# Patient Record
Sex: FEMALE | Race: WHITE | Employment: FULL TIME | URBAN - METROPOLITAN AREA
[De-identification: names, ages, dates, MRNs, and addresses within clinical notes are randomized per-mention and may not be internally consistent; named-entity substitution may affect disease eponyms.]

---

## 2021-01-27 ENCOUNTER — TRANSCRIBE ORDER (OUTPATIENT)
Dept: SCHEDULING | Age: 51
End: 2021-01-27

## 2021-01-27 DIAGNOSIS — H93.A3 PULSATILE TINNITUS, BILATERAL: Primary | ICD-10-CM

## 2021-02-12 ENCOUNTER — APPOINTMENT (OUTPATIENT)
Dept: GENERAL RADIOLOGY | Age: 51
End: 2021-02-12
Attending: EMERGENCY MEDICINE
Payer: COMMERCIAL

## 2021-02-12 ENCOUNTER — HOSPITAL ENCOUNTER (EMERGENCY)
Age: 51
Discharge: HOME | End: 2021-02-12
Attending: EMERGENCY MEDICINE
Payer: COMMERCIAL

## 2021-02-12 VITALS
BODY MASS INDEX: 21.39 KG/M2 | TEMPERATURE: 97.1 F | HEART RATE: 96 BPM | OXYGEN SATURATION: 97 % | SYSTOLIC BLOOD PRESSURE: 159 MMHG | DIASTOLIC BLOOD PRESSURE: 129 MMHG | RESPIRATION RATE: 16 BRPM | WEIGHT: 144.84 LBS

## 2021-02-12 DIAGNOSIS — K56.41 FECAL IMPACTION OF RECTUM (HCC): Primary | ICD-10-CM

## 2021-02-12 PROCEDURE — 99282 EMERGENCY DEPT VISIT SF MDM: CPT

## 2021-02-12 PROCEDURE — 99283 EMERGENCY DEPT VISIT LOW MDM: CPT

## 2021-02-12 RX ORDER — POLYETHYLENE GLYCOL 3350 17 G/17G
POWDER, FOR SOLUTION ORAL
Qty: 289 G | Refills: 0 | Status: SHIPPED | OUTPATIENT
Start: 2021-02-12 | End: 2021-05-10

## 2021-02-12 NOTE — Clinical Note
21 [facility] EMERGENCY DEPT 
[address] [facility] [place] [address] 
[phone] Work/School Note Date: 2/12/2021 To Whom It May concern: 
 
Efrain Caceres was seen and treated today in the emergency room by the following provider(s): 
Attending Provider: Elly Andres MD. Efrain Caceres is excused from work/school on 2/12/2021 through 2/15/2021. She is medically clear to return to work/school on 2/16/2021. Sincerely, Shane Simons MD

## 2021-02-12 NOTE — ED TRIAGE NOTES
Pt had surgery on Monday and now has urinary retention (r/t poor fluid intake) and constipation (not taking stool softner with narcotics).

## 2021-02-12 NOTE — ED PROVIDER NOTES
The patient is a 53-year-old female with a past medical history significant for hysterectomy, IBS, thromboembolism status post reverse abdominoplasty 5 days ago who presents to the ED with a complaint of rectal pain and inability to have a bowel movement in the last week accompanied by decreased urination today. The patient has been taking pain medication since surgery however she is having difficulty having bowel movement feels a lot of pressure and pain in the rectum area. She denies any fever or chills, sore throat, cough, congestion, headache, neck or back pain, chest pain, shortness of breath, nausea, vomiting, dysuria, vaginal discharge or bleeding, extremity weakness numbness. Past Medical History:   Diagnosis Date    IBS (irritable bowel syndrome)     Thromboembolus (HCC)        Past Surgical History:   Procedure Laterality Date    HX HYSTERECTOMY      HX ORTHOPAEDIC      knee and foot surgery         History reviewed. No pertinent family history. Social History     Socioeconomic History    Marital status:      Spouse name: Not on file    Number of children: Not on file    Years of education: Not on file    Highest education level: Not on file   Occupational History    Not on file   Social Needs    Financial resource strain: Not on file    Food insecurity     Worry: Not on file     Inability: Not on file    Transportation needs     Medical: Not on file     Non-medical: Not on file   Tobacco Use    Smoking status: Current Every Day Smoker     Packs/day: 0.50   Substance and Sexual Activity    Alcohol use:  Yes    Drug use: Not on file    Sexual activity: Not on file   Lifestyle    Physical activity     Days per week: Not on file     Minutes per session: Not on file    Stress: Not on file   Relationships    Social connections     Talks on phone: Not on file     Gets together: Not on file     Attends [employer] service: Not on file     Active member of club or organization: Not on file     Attends meetings of clubs or organizations: Not on file     Relationship status: Not on file    Intimate partner violence     Fear of current or ex partner: Not on file     Emotionally abused: Not on file     Physically abused: Not on file     Forced sexual activity: Not on file   Other Topics Concern    Not on file   Social History Narrative    Not on file         ALLERGIES: Codeine    Review of Systems   All other systems reviewed and are negative. Vitals:    02/12/21 0324   BP: (!) 159/129   Pulse: 96   Resp: 16   Temp: 97.1 °F (36.2 °C)   SpO2: 97%   Weight: 65.7 kg (144 lb 13.5 oz)            Physical Exam  Vitals signs and nursing note reviewed. CONSTITUTIONAL: Well-appearing; well-nourished; in milddistress  HEAD: Normocephalic; atraumatic  EYES: PERRL; EOM intact; conjunctiva and sclera are clear bilaterally. ENT: No rhinorrhea; normal pharynx with no tonsillar hypertrophy; mucous membranes pink/moist, no erythema, no exudate. NECK: Supple; non-tender; no cervical lymphadenopathy  CARD: Normal S1, S2; no murmurs, rubs, or gallops. Regular rate and rhythm. RESP: Normal respiratory effort; breath sounds clear and equal bilaterally; no wheezes, rhonchi, or rales. ABD: Normal bowel sounds; non-distended; non-tender; no palpable organomegaly, no masses, no bruits. Back Exam: Normal inspection; no vertebral point tenderness, no CVA tenderness. Normal range of motion. EXT: Normal ROM in all four extremities; non-tender to palpation; no swelling or deformity; distal pulses are normal, no edema.   SKIN: Warm; dry; surgical incision appears clean, mildly indurated ecchymotic, clean and dry   NEURO:Alert and oriented x 3, coherent, ROZINA-XII grossly intact, sensory and motor are non-focal.  Rectal exam: Normal inspection; good tone; normal colored stool; moderate stool in the rectum vault        MDM  Number of Diagnoses or Management Options  Fecal impaction of rectum [facility])  Diagnosis management comments: Assessment: Obstipation/fecal impaction. The patient had a bladder scan with approximately 25 cc of urinary residue in the bladder. Plan: Soapsuds enema/serial exam/education, reassurance, symptomatic treatment/ Monitor and Reevaluate. Amount and/or Complexity of Data Reviewed  Clinical lab tests: ordered and reviewed  Tests in the radiology section of CPT®: ordered and reviewed  Tests in the medicine section of CPT®: reviewed and ordered  Discussion of test results with the performing providers: yes  Decide to obtain previous medical records or to obtain history from someone other than the patient: yes  Obtain history from someone other than the patient: yes  Review and summarize past medical records: yes  Discuss the patient with other providers: yes  Independent visualization of images, tracings, or specimens: yes    Risk of Complications, Morbidity, and/or Mortality  Presenting problems: moderate  Diagnostic procedures: moderate  Management options: moderate           Procedures      Progress Note:   Pt has been reexamined by Shane Simons MD. Pt is feeling much better. Symptoms have improved. All available results have been reviewed with pt and any available family. The patient was manually disimpacted by me with good results. She is feeling much better and denies any further discomfort at this time. pt understands sx, dx, and tx in ED. Care plan has been outlined and questions have been answered. Pt is ready to go home. Will send home on fecal impaction instruction. Prescription for MiraLAX. Outpatient referral with PCP as needed. Written by Shane Simons MD,4:31 AM    .   .

## 2021-02-26 ENCOUNTER — HOSPITAL ENCOUNTER (EMERGENCY)
Age: 51
Discharge: HOME | End: 2021-02-26
Attending: EMERGENCY MEDICINE
Payer: COMMERCIAL

## 2021-02-26 VITALS
OXYGEN SATURATION: 99 % | TEMPERATURE: 97.5 F | BODY MASS INDEX: 25.4 KG/M2 | SYSTOLIC BLOOD PRESSURE: 115 MMHG | HEART RATE: 71 BPM | WEIGHT: 138 LBS | HEIGHT: 62 IN | RESPIRATION RATE: 26 BRPM | DIASTOLIC BLOOD PRESSURE: 63 MMHG

## 2021-02-26 DIAGNOSIS — T78.40XA ALLERGIC REACTION, INITIAL ENCOUNTER: Primary | ICD-10-CM

## 2021-02-26 PROCEDURE — 99284 EMERGENCY DEPT VISIT MOD MDM: CPT

## 2021-02-26 PROCEDURE — 74011250637 HC RX REV CODE- 250/637: Performed by: EMERGENCY MEDICINE

## 2021-02-26 PROCEDURE — 93005 ELECTROCARDIOGRAM TRACING: CPT

## 2021-02-26 PROCEDURE — 99285 EMERGENCY DEPT VISIT HI MDM: CPT

## 2021-02-26 RX ORDER — DIPHENHYDRAMINE HCL 25 MG
25 CAPSULE ORAL
Status: COMPLETED | OUTPATIENT
Start: 2021-02-26 | End: 2021-02-26

## 2021-02-26 RX ADMIN — DIPHENHYDRAMINE HYDROCHLORIDE 25 MG: 25 CAPSULE ORAL at 19:50

## 2021-02-27 NOTE — ED PROVIDER NOTES
The history is provided by the patient. No  was used. Allergic Reaction   This is a recurrent problem. The current episode started 3 to 5 hours ago. The problem has not changed since onset. Pertinent negatives include no unusual behavior, no slurred speech, no walking unsteadily, no nausea, no vomiting, no depression, no suicidal ideas, no confusion and no shortness of breath. Past Medical History:   Diagnosis Date    IBS (irritable bowel syndrome)     Thromboembolus (HCC)        Past Surgical History:   Procedure Laterality Date    HX HYSTERECTOMY      HX ORTHOPAEDIC      knee and foot surgery         History reviewed. No pertinent family history. Social History     Socioeconomic History    Marital status:      Spouse name: Not on file    Number of children: Not on file    Years of education: Not on file    Highest education level: Not on file   Occupational History    Not on file   Social Needs    Financial resource strain: Not on file    Food insecurity     Worry: Not on file     Inability: Not on file    Transportation needs     Medical: Not on file     Non-medical: Not on file   Tobacco Use    Smoking status: Current Every Day Smoker     Packs/day: 0.50    Smokeless tobacco: Never Used   Substance and Sexual Activity    Alcohol use:  Yes    Drug use: Not on file    Sexual activity: Not on file   Lifestyle    Physical activity     Days per week: Not on file     Minutes per session: Not on file    Stress: Not on file   Relationships    Social connections     Talks on phone: Not on file     Gets together: Not on file     Attends Yarsanism service: Not on file     Active member of club or organization: Not on file     Attends meetings of clubs or organizations: Not on file     Relationship status: Not on file    Intimate partner violence     Fear of current or ex partner: Not on file     Emotionally abused: Not on file     Physically abused: Not on file Forced sexual activity: Not on file   Other Topics Concern    Not on file   Social History Narrative    Not on file         ALLERGIES: Codeine    Review of Systems   Constitutional: Negative for activity change, chills and fever. HENT: Negative for nosebleeds, sore throat, trouble swallowing and voice change. Eyes: Negative for visual disturbance. Respiratory: Negative for shortness of breath. Cardiovascular: Positive for palpitations. Negative for chest pain. Gastrointestinal: Negative for abdominal pain, constipation, diarrhea, nausea and vomiting. Genitourinary: Negative for difficulty urinating, dysuria, hematuria and urgency. Musculoskeletal: Negative for back pain, neck pain and neck stiffness. Skin: Negative for color change. Allergic/Immunologic: Negative for immunocompromised state. Neurological: Negative for dizziness, seizures, syncope, weakness, light-headedness, numbness and headaches. Psychiatric/Behavioral: Negative for behavioral problems, confusion, depression, hallucinations, self-injury and suicidal ideas. Vitals:    02/26/21 1933 02/26/21 1936 02/26/21 1939   BP: 130/79 130/79    Pulse:  78 70   Resp:  14 18   Temp:  97.5 °F (36.4 °C)    SpO2: 100% 100% 100%   Weight:  62.6 kg (138 lb)    Height:  5' 2\" (1.575 m)             Physical Exam  Vitals signs and nursing note reviewed. Constitutional:       General: She is not in acute distress. Appearance: She is well-developed. She is not diaphoretic. HENT:      Head: Normocephalic and atraumatic. Eyes:      Pupils: Pupils are equal, round, and reactive to light. Neck:      Musculoskeletal: Normal range of motion and neck supple. Cardiovascular:      Rate and Rhythm: Normal rate. Rhythm irregularly irregular. Heart sounds: Normal heart sounds. No murmur. No friction rub. No gallop. Pulmonary:      Effort: Pulmonary effort is normal. No respiratory distress. Breath sounds: Normal breath sounds. No wheezing. Abdominal:      General: Bowel sounds are normal. There is no distension. Palpations: Abdomen is soft. Tenderness: There is no abdominal tenderness. There is no guarding or rebound. Musculoskeletal: Normal range of motion. Skin:     General: Skin is warm. Findings: No rash. Neurological:      Mental Status: She is alert and oriented to person, place, and time. Psychiatric:         Behavior: Behavior normal.         Thought Content: Thought content normal.         Judgment: Judgment normal.          MDM      This is a 51-year-old female with past medical history, review of systems, physical exam as above, presenting with complaints of allergic reaction. Patient states approximately 2 hours after receiving her second round of the coronavirus vaccine, she developed palpitations and pruritus. She denies chest pain or shortness of breath, difficulty swallowing, wheezing, nausea or vomiting. Patient states she had similar symptoms after her first administration approximately 3 to 4 weeks ago. She states she called her [facility] helpline, and was referred to the emergency department. Physical exam is remarkable for well-appearing middle-aged female, in no acute distress noted to be normotensive, afebrile without tachycardia, satting well on room air. She has clear breath sounds to auscultation, no visible rash, patent posterior pharynx without erythema or swelling, regular rate, irregular rhythm without murmurs gallops or rubs. Patient states she is driving herself this evening. Will obtain EKG and provide oral Benadryl, observe, and make a disposition.     Procedures

## 2021-02-27 NOTE — ED TRIAGE NOTES
Pt reports that she took her second dose of the Covid 19 vaccine today at 1300. Pt started with heart palpitations and itching. Pt states that the first dose caused palpitations and resolved after approx. 4 days.

## 2021-02-28 LAB
ATRIAL RATE: 66 BPM
CALCULATED P AXIS, ECG09: 69 DEGREES
CALCULATED R AXIS, ECG10: 95 DEGREES
CALCULATED T AXIS, ECG11: 91 DEGREES
DIAGNOSIS, 93000: NORMAL
P-R INTERVAL, ECG05: 152 MS
Q-T INTERVAL, ECG07: 424 MS
QRS DURATION, ECG06: 94 MS
QTC CALCULATION (BEZET), ECG08: 444 MS
VENTRICULAR RATE, ECG03: 66 BPM

## 2021-05-10 ENCOUNTER — CLINICAL SUPPORT (OUTPATIENT)
Dept: CARDIOLOGY CLINIC | Age: 51
End: 2021-05-10

## 2021-05-10 ENCOUNTER — OFFICE VISIT (OUTPATIENT)
Dept: CARDIOLOGY CLINIC | Age: 51
End: 2021-05-10
Payer: COMMERCIAL

## 2021-05-10 VITALS
HEIGHT: 62 IN | HEART RATE: 56 BPM | WEIGHT: 140 LBS | OXYGEN SATURATION: 97 % | BODY MASS INDEX: 25.76 KG/M2 | RESPIRATION RATE: 16 BRPM | SYSTOLIC BLOOD PRESSURE: 100 MMHG | DIASTOLIC BLOOD PRESSURE: 60 MMHG

## 2021-05-10 DIAGNOSIS — Z86.718 HISTORY OF DVT (DEEP VEIN THROMBOSIS): ICD-10-CM

## 2021-05-10 DIAGNOSIS — I49.9 IRREGULAR HEART BEAT: Primary | ICD-10-CM

## 2021-05-10 DIAGNOSIS — R06.02 SHORTNESS OF BREATH: ICD-10-CM

## 2021-05-10 DIAGNOSIS — R00.2 PALPITATIONS: Primary | ICD-10-CM

## 2021-05-10 DIAGNOSIS — R00.2 HEART PALPITATIONS: ICD-10-CM

## 2021-05-10 PROCEDURE — 99244 OFF/OP CNSLTJ NEW/EST MOD 40: CPT | Performed by: INTERNAL MEDICINE

## 2021-05-10 RX ORDER — OMEGA-3 FATTY ACIDS/FISH OIL 300-500 MG
CAPSULE ORAL
Status: ON HOLD | COMMUNITY
End: 2022-01-07

## 2021-05-10 RX ORDER — BISMUTH SUBSALICYLATE 262 MG
1 TABLET,CHEWABLE ORAL DAILY
COMMUNITY

## 2021-05-10 RX ORDER — ACYCLOVIR 400 MG/1
TABLET ORAL
Status: ON HOLD | COMMUNITY
Start: 2021-04-13 | End: 2022-01-07

## 2021-05-10 RX ORDER — MULTIVIT WITH MINERALS/HERBS
1 TABLET ORAL DAILY
COMMUNITY
End: 2021-09-07

## 2021-05-10 RX ORDER — DM/PE/ACETAMINOPHEN/CHLORPHENR 10-5-325-2
3000 TABLET, SEQUENTIAL ORAL
COMMUNITY
End: 2021-09-07

## 2021-05-10 NOTE — PATIENT INSTRUCTIONS
You will be scheduled for a stress echocardiogram and a 24 hour holter after your appointment today. Please wear comfortable clothing (shorts or pants with a shirt or blouse) and walking/athletic shoes.  
 
Do not eat or drink anything, except water, for at least 2 hours prior to your test. 
 
 Do take your scheduled medications prior to your test.

## 2021-05-10 NOTE — PROGRESS NOTES
[facility] [place]: Pee Mcgraw  [phone]    History of Present Illness:  Ms. Hailey George is a 51 yo F with a history of thromboembolism status post surgery and on anticoagulation for a brief period of time, she had palpitations and what sounds like PVCs after her third pregnancy, history of tobacco use, quit, but is now vaping, the DVT was after knee surgery, referred by Dr. Alesia Hurtado for cardiac evaluation. She is here due to progressive and more frequent palpitations. She thinks it started back when she got her COVID shot in January and then her second shot in February. She started feeling \"strong\" and \"irregular\" heartbeats that would occur now daily. She occasionally feels pausing of her heartbeats, as well as there are occasions where she will wake up \"gasping for air\". She is trying to get back to regular exercise and activity and she is a regular runner. She is compensated on exam with clear lungs and no lower extremity edema. Her EKG I reviewed personally from February, the emergency room, and it was normal sinus rhythm with sinus arrhythmia. Soc hx. Tobacco quit, vaping now. Fam hx. Dad MVA. Assessment and Plan:   1. Palpitations. Will obtain a 24 hour Holter to evaluate for possible arrhythmia. 2. Unstable angina. Dyspnea on exertion. Possible anginal equivalent and will proceed with a treadmill stress echocardiogram for further evaluation. 3. History of tobacco use, now vaping. 4. History of DVT, status post knee surgery, briefly on a blood thinner. She  has a past medical history of IBS (irritable bowel syndrome) and Thromboembolus ([address] All other systems negative except as above. PE  Vitals:    05/10/21 1112   BP: 100/60   Pulse: (!) 56   Resp: 16   SpO2: 97%   Weight: 140 lb (63.5 kg)   Height: 5' 2\" (1.575 m)    Body mass index is 25.61 kg/m².    General appearance - alert, well appearing, and in no distress  Mental status - affect appropriate to mood  Eyes - sclera anicteric, moist mucous membranes  Neck - supple, no JVD  Chest - clear to auscultation, no wheezes, rales or rhonchi  Heart - normal rate, regular rhythm, normal S1, S2, no murmurs, rubs, clicks or gallops  Abdomen - soft, nontender, nondistended, no masses or organomegaly  Neurological - no focal deficit  Extremities - peripheral pulses normal, no pedal edema      Recent Labs:  No results found for: CHOL, CHOLX, CHLST, CHOLV, 614434, HDL, HDLP, LDL, LDLC, DLDLP, TGLX, TRIGL, TRIGP, CHHD, CHHDX  Lab Results   Component Value Date/Time    Creatinine 0.61 03/20/2014 08:17 PM     Lab Results   Component Value Date/Time    BUN 13 03/20/2014 08:17 PM     Lab Results   Component Value Date/Time    Potassium 4.1 03/20/2014 08:17 PM     No results found for: HBA1C, HGBE8, UCP9DPAT, ESQ9NGRG  Lab Results   Component Value Date/Time    HGB 14.3 03/20/2014 08:17 PM     Lab Results   Component Value Date/Time    PLATELET 192 61/38/5559 08:17 PM       Reviewed:  Past Medical History:   Diagnosis Date    IBS (irritable bowel syndrome)     Thromboembolus (HCC)      Social History     Tobacco Use   Smoking Status Current Every Day Smoker    Packs/day: 0.50   Smokeless Tobacco Never Used     Social History     Substance and Sexual Activity   Alcohol Use Yes     Allergies   Allergen Reactions    Codeine Rash       Current Outpatient Medications   Medication Sig    acyclovir (ZOVIRAX) 400 mg tablet TAKE 1 TABLET BY MOUTH EVERY 12 HOURS    TURMERIC PO Take  by mouth.  fish oil-omega-3 fatty acids (Fish Oil) 300-500 mg cap Take  by mouth.  glucosamine (Glucosamine Relief) 1,000 mg tab Take 3,000 mg by mouth.  b complex vitamins tablet Take 1 Tab by mouth daily.  multivitamin (ONE A DAY) tablet Take 1 Tab by mouth daily.  enoxaparin sodium (LOVENOX SC) by SubCUTAneous route.     polyethylene glycol (Miralax) 17 gram/dose powder 1 tablespoon with 8 oz of water every hour until stool are clear    dextroamphetamine-amphetamine (ADDERALL) 20 mg tablet Take 10 mg by mouth daily. No current facility-administered medications for this visit.         Oliva Peterson MD  [facility] heart and [facility] [place]  [address]  [facility], [address]

## 2021-05-10 NOTE — LETTER
Patient:  Donald Ansari YOB: 1970 Date of Visit: 5/10/2021 Dear Ricci Rhodes MD 
[address] [place] [address] Via Fax: [phone]: Thank you for referring Ms. Sofia Damon to me for evaluation/treatment. Below are the relevant portions of my assessment and plan of care. Ms. Mandy Dakin is a 53 yo F with a history of thromboembolism status post surgery and on anticoagulation for a brief period of time, she had palpitations and what sounds like PVCs after her third pregnancy, history of tobacco use, quit, but is now vaping, the DVT was after knee surgery, referred by Dr. Baylee Orozco for cardiac evaluation. She is here due to progressive and more frequent palpitations. She thinks it started back when she got her COVID shot in January and then her second shot in February. She started feeling \"strong\" and \"irregular\" heartbeats that would occur now daily. She occasionally feels pausing of her heartbeats, as well as there are occasions where she will wake up \"gasping for air\". She is trying to get back to regular exercise and activity and she is a regular runner. She is compensated on exam with clear lungs and no lower extremity edema. Her EKG I reviewed personally from February, the emergency room, and it was normal sinus rhythm with sinus arrhythmia. Soc hx. Tobacco quit, vaping now. Fam hx. Dad MVA. Assessment and Plan: 1. Palpitations. Will obtain a 24 hour Holter to evaluate for possible arrhythmia. 2. Unstable angina. Dyspnea on exertion. Possible anginal equivalent and will proceed with a treadmill stress echocardiogram for further evaluation. 3. History of tobacco use, now vaping. 4. History of DVT, status post knee surgery, briefly on a blood thinner. If you have questions, please do not hesitate to call me. Sincerely, Mj Kevin MD

## 2021-05-21 ENCOUNTER — TELEPHONE (OUTPATIENT)
Dept: CARDIOLOGY CLINIC | Age: 51
End: 2021-05-21

## 2021-05-21 NOTE — TELEPHONE ENCOUNTER
Patient's returned holter says no retrievable data. Called [employer] and they said to send it back to them to try to retrieve data. Went out early this week.  Rep will let me know if nothing was found on the device so we can have patient repeat test.

## 2021-05-26 ENCOUNTER — TELEPHONE (OUTPATIENT)
Dept: CARDIOLOGY CLINIC | Age: 51
End: 2021-05-26

## 2021-05-26 NOTE — TELEPHONE ENCOUNTER
Received call back from Mrs. Katie Banerjee - scheduled for her to come in 5-26-21 for repeat holter.

## 2021-05-26 NOTE — TELEPHONE ENCOUNTER
LVM for patient stating that the holter monitor she wore on 5/10 malfunctioned and was showing no data on the device. We sent it back to [facility] in hopes they could retreive data but were unable to do so. Asked patient to call to schedule a repeat monitor at her earliest convenience. Informed Dr. Raymona Lesch of this as well.

## 2021-05-26 NOTE — PROGRESS NOTES
Device malfunctioned and showed no data. [employer] was unable to retreive. Contacted patient via  and asked to call in to repeat study. Dr. Higginbotham Oar informed. Non-chargeable visit.

## 2021-05-27 ENCOUNTER — CLINICAL SUPPORT (OUTPATIENT)
Dept: CARDIOLOGY CLINIC | Age: 51
End: 2021-05-27
Payer: COMMERCIAL

## 2021-05-27 DIAGNOSIS — Z86.718 HISTORY OF DVT (DEEP VEIN THROMBOSIS): ICD-10-CM

## 2021-05-27 DIAGNOSIS — R06.02 SHORTNESS OF BREATH: ICD-10-CM

## 2021-05-27 DIAGNOSIS — I49.9 IRREGULAR HEART BEAT: ICD-10-CM

## 2021-05-27 DIAGNOSIS — R00.2 HEART PALPITATIONS: ICD-10-CM

## 2021-05-27 PROCEDURE — 93225 XTRNL ECG REC<48 HRS REC: CPT | Performed by: INTERNAL MEDICINE

## 2021-05-27 PROCEDURE — 93227 XTRNL ECG REC<48 HR R&I: CPT | Performed by: INTERNAL MEDICINE

## 2021-06-02 ENCOUNTER — TELEPHONE (OUTPATIENT)
Dept: CARDIOLOGY CLINIC | Age: 51
End: 2021-06-02

## 2021-06-02 RX ORDER — METOPROLOL SUCCINATE 25 MG/1
25 TABLET, EXTENDED RELEASE ORAL DAILY
Qty: 30 TABLET | Refills: 5 | Status: SHIPPED | OUTPATIENT
Start: 2021-06-02 | End: 2021-06-10

## 2021-06-02 NOTE — TELEPHONE ENCOUNTER
Garrett Redding MD  You 45 minutes ago (9:48 AM)   SW  Please let pt know holter showed benign, but frequent premature atrial contractions (>2K in 24 hrs). Recommend starting toprol 25 mg once daily for this. Follow up 1 month after starting to assess response. I still think a stress test will be helpful to assess her other symptoms, exercise and activity.  thx

## 2021-06-02 NOTE — TELEPHONE ENCOUNTER
Patient calling in regards to upcoming stress echo. States she would like to know if it can be put on hold until holter results come in. Patient requesting further discussion.     Phone: [phone]

## 2021-06-02 NOTE — TELEPHONE ENCOUNTER
Returned call to patient. Two patient indentifiers verified. Pt was informed of the results of the holter monitor. Pt verified pharmacy with patient and date and time for stress test. Pt asked about cost of stress test. Pt was informed to reach out to her insurance company but stated that they were not helpful. Pt was given the phone number for billing. Pt verbalized understanding and denies any further questions at this time.      Future Appointments   Date Time Provider Lamar Garcia   6/10/2021 10:00 AM HARPREET SELBY BS AMB   6/10/2021 10:00 AM HARPREET MOLINA BS AMB   7/12/2021 10:40 AM MD JOANIE Rene BS AMB

## 2021-06-10 ENCOUNTER — ANCILLARY PROCEDURE (OUTPATIENT)
Dept: CARDIOLOGY CLINIC | Age: 51
End: 2021-06-10
Payer: COMMERCIAL

## 2021-06-10 ENCOUNTER — APPOINTMENT (OUTPATIENT)
Dept: CARDIOLOGY CLINIC | Age: 51
End: 2021-06-10

## 2021-06-10 ENCOUNTER — TELEPHONE (OUTPATIENT)
Dept: CARDIOLOGY CLINIC | Age: 51
End: 2021-06-10

## 2021-06-10 VITALS
WEIGHT: 140 LBS | DIASTOLIC BLOOD PRESSURE: 80 MMHG | SYSTOLIC BLOOD PRESSURE: 110 MMHG | HEIGHT: 62 IN | BODY MASS INDEX: 25.76 KG/M2

## 2021-06-10 DIAGNOSIS — I49.9 IRREGULAR HEART BEAT: ICD-10-CM

## 2021-06-10 DIAGNOSIS — R06.02 SHORTNESS OF BREATH: ICD-10-CM

## 2021-06-10 DIAGNOSIS — R00.2 HEART PALPITATIONS: ICD-10-CM

## 2021-06-10 DIAGNOSIS — Z86.718 HISTORY OF DVT (DEEP VEIN THROMBOSIS): ICD-10-CM

## 2021-06-10 LAB
ECHO TV REGURGITANT MAX VELOCITY: 234.53 CM/S
ECHO TV REGURGITANT PEAK GRADIENT: 22 MMHG
STRESS ANGINA INDEX: 0
STRESS BASELINE DIAS BP: 80 MMHG
STRESS BASELINE HR: 47 BPM
STRESS BASELINE SYS BP: 110 MMHG
STRESS ESTIMATED WORKLOAD: 13.4 METS
STRESS EXERCISE DUR MIN: NORMAL
STRESS O2 SAT PEAK: 97 %
STRESS PEAK DIAS BP: 80 MMHG
STRESS PEAK SYS BP: 194 MMHG
STRESS PERCENT HR ACHIEVED: 93 %
STRESS POST PEAK HR: 160 BPM
STRESS RATE PRESSURE PRODUCT: NORMAL BPM*MMHG
STRESS ST DEPRESSION: 0 MM
STRESS ST ELEVATION: 0 MM
STRESS TARGET HR: 172 BPM

## 2021-06-10 PROCEDURE — 93351 STRESS TTE COMPLETE: CPT | Performed by: INTERNAL MEDICINE

## 2021-06-10 NOTE — TELEPHONE ENCOUNTER
Returned call to patient. Two patient indentifiers verified. Pt was informed of the results. Pt stated she is stopping toprol because she doesn't like the way it makes her feel. Pt also is cancelling follow up appointment. Pt to call back if she has any symptoms and would like a follow up. Pt verbalized understanding and denies any further questions.     Updated medication list per VO Dr. Aura Aguilar

## 2021-06-10 NOTE — TELEPHONE ENCOUNTER
----- Message from Laura Marsh MD sent at 6/10/2021 11:08 AM EDT -----  Please let pt know stress test was normal. Can resume regular exercise and activity without restriction.  thx

## 2021-06-11 NOTE — TELEPHONE ENCOUNTER
MD Karen Hernandez, LILA  Please let pt know I reviewed her case and I would stop toprol and replace this with cardizem  mg once daily. Follow up in 1 month after starting this      LVM for patient to return call at earliest convenience.

## 2021-06-17 ENCOUNTER — TELEPHONE (OUTPATIENT)
Dept: CARDIOLOGY CLINIC | Age: 51
End: 2021-06-17

## 2021-06-17 RX ORDER — DILTIAZEM HYDROCHLORIDE 120 MG/1
120 CAPSULE, COATED, EXTENDED RELEASE ORAL DAILY
Qty: 30 CAPSULE | Refills: 5 | Status: SHIPPED | OUTPATIENT
Start: 2021-06-17 | End: 2021-09-07

## 2021-06-17 NOTE — TELEPHONE ENCOUNTER
MD Mariaa Gonzalez, RN  Please let pt know I reviewed her case and I would stop toprol and replace this with cardizem  mg once daily. Follow up in 1 month after starting this       8:42 AM  Returned call to patient. Two patient indentifiers verified. Pt was informed of this message. Pharmacy verified. Pt scheduled for a follow up appointment. Pt verbalized understanding and denies any further questions at this time.      Future Appointments   Date Time Provider Lamar Garcia   7/16/2021  8:40 AM MD JOANIE Gonzalez AMB

## 2021-06-21 ENCOUNTER — TELEPHONE (OUTPATIENT)
Dept: CARDIOLOGY CLINIC | Age: 51
End: 2021-06-21

## 2021-06-21 NOTE — TELEPHONE ENCOUNTER
Informed that Dr. Aura Aguilar is not in office today. She wants to talk with Dr. Aura Aguilar directly. Recommended scheduling an office visit or virtual visit. She is in a meeting and will call office to schedule.

## 2021-06-21 NOTE — TELEPHONE ENCOUNTER
Patient states she understands Dr. Tika Garcia was not in the office today. Patient is requesting to be called by tomorrow morning \"before 11:15am.\" Patient is requesting to be spoken to by Dr. Tika Garcia regarding the symptoms and side effects of her medication.     PHONE: [phone]

## 2021-06-21 NOTE — TELEPHONE ENCOUNTER
Patient is requesting to speak with Dr. Bryant Leiva (patient states that she needs to speak with Dr. Bryant Leiva directly and not a nurse). She states that she would like to discuss the side affects and symptoms of a medication she has recently started taking. Please advise.      Phone: [phone]

## 2021-06-22 ENCOUNTER — TELEPHONE (OUTPATIENT)
Dept: CARDIOLOGY CLINIC | Age: 51
End: 2021-06-22

## 2021-06-22 NOTE — TELEPHONE ENCOUNTER
Verified patient with two types of identifiers. Scheduled patient for new patient appointment. Patient verbalized understanding and will call with any other questions.       Future Appointments   Date Time Provider Lamar Garcia   7/8/2021 12:40 PM MD JOANIE Browning AMB   7/16/2021  8:40 AM Gaynel Nageotte, MD CAVREY BS AMB

## 2021-06-22 NOTE — TELEPHONE ENCOUNTER
Saniya Rodriguez MD  You; Paolo Cedillo RN 14 hours ago (6:26 PM)   SW  Working on setting up an appt with Dr. Dania Schwarz for consideration ablation. I spoke with pt and will touch base with Dr. Dania Schwarz. You can cancel my office visit for tomorrow.  thx

## 2021-06-22 NOTE — TELEPHONE ENCOUNTER
----- Message from Mikie Ohara MD sent at 6/22/2021  6:53 AM EDT -----  Dr Shayna Sanz asked for appt in 2 weeks for palpitation PAC SVT  thanks  ----- Message -----  From: Garrett Redding MD  Sent: 6/21/2021   6:24 PM EDT  To: Mikie Ohara MD    Pt I want to touch base with you about.  Thx Ruddy

## 2021-06-23 ENCOUNTER — TELEPHONE (OUTPATIENT)
Dept: CARDIOLOGY CLINIC | Age: 51
End: 2021-06-23

## 2021-06-23 NOTE — TELEPHONE ENCOUNTER
Future Appointments   Date Time Provider Lamar Garcia   7/8/2021 12:40 PM MD JOANIE Tarango AMB   7/16/2021  8:40 AM MD JOANIE Odell AMB

## 2021-06-23 NOTE — TELEPHONE ENCOUNTER
MD Sandi Pereira RN; Lorenza Stephenson LPN; Prachi Luis asked for appt in 2 weeks for palpitation PAC SVT   thanks           Previous Messages       ----- Message -----   From: Georgina Mcconnell MD   Sent: 6/21/2021   6:24 PM EDT   To: Serafin Holliday MD     Pt I want to touch base with you about.  Thx Ruddy

## 2021-09-07 ENCOUNTER — OFFICE VISIT (OUTPATIENT)
Dept: CARDIOLOGY CLINIC | Age: 51
End: 2021-09-07
Payer: COMMERCIAL

## 2021-09-07 ENCOUNTER — TRANSCRIBE ORDER (OUTPATIENT)
Dept: SCHEDULING | Age: 51
End: 2021-09-07

## 2021-09-07 VITALS
HEIGHT: 62 IN | OXYGEN SATURATION: 99 % | HEART RATE: 50 BPM | DIASTOLIC BLOOD PRESSURE: 70 MMHG | RESPIRATION RATE: 16 BRPM | WEIGHT: 140 LBS | BODY MASS INDEX: 25.76 KG/M2 | SYSTOLIC BLOOD PRESSURE: 110 MMHG

## 2021-09-07 DIAGNOSIS — I49.1 PAC (PREMATURE ATRIAL CONTRACTION): Primary | ICD-10-CM

## 2021-09-07 DIAGNOSIS — Z86.718 HISTORY OF DVT (DEEP VEIN THROMBOSIS): ICD-10-CM

## 2021-09-07 DIAGNOSIS — R00.1 SINUS BRADYCARDIA: ICD-10-CM

## 2021-09-07 DIAGNOSIS — I49.9 IRREGULAR HEART BEAT: ICD-10-CM

## 2021-09-07 DIAGNOSIS — R00.2 HEART PALPITATIONS: ICD-10-CM

## 2021-09-07 DIAGNOSIS — H93.A3 PULSATILE TINNITUS, BILATERAL: Primary | ICD-10-CM

## 2021-09-07 PROCEDURE — 99204 OFFICE O/P NEW MOD 45 MIN: CPT | Performed by: INTERNAL MEDICINE

## 2021-09-07 RX ORDER — FLECAINIDE ACETATE 50 MG/1
50 TABLET ORAL 2 TIMES DAILY
Qty: 180 TABLET | Refills: 1 | Status: SHIPPED | OUTPATIENT
Start: 2021-09-07 | End: 2021-10-28

## 2021-09-07 NOTE — PROGRESS NOTES
Cardiac Electrophysiology OFFICE Consultation Note     Subjective:       Sukhdeep Godinez is a 52 y.o. femalepatient who is seen for evaluation/management of PACs & PSVT. Deuce Merritt was kindly referred by Dr. Dea Kelly.     She notes daily \"strong\" & irregular beats. Flo Fong with DE LEON. Rosalva Peterson this began after her COVID vaccine, no longer as pronounced, but hasn't diminished in frequency; still experiences them all day, severity 5/10.  Doesn't notice as much when exercising, but isn't able to exercise as much as she used to. Holter showed 3% PACs, few PVC.  Stress echo was reported negative for ischemia by Dr Dea Kelly. Unable to tolerate diltiazem ER & Toprol XL.  HR & BP both trend low. She had headache with it    Prior DVT, but no longer on anticoagulant. Previous:   History of DVT post knee surgery. Palpitations, likely PVCs after 3rd pregnancy. [employer]. Works in [employer]       Current Outpatient Medications   Medication Sig Dispense Refill   · flecainide (TAMBOCOR) 50 mg tablet Take 1 Tablet by mouth two (2) times a day. 180 Tablet 1   · acyclovir (ZOVIRAX) 400 mg tablet TAKE 1 TABLET BY MOUTH EVERY 12 HOURS   · fish oil-omega-3 fatty acids (Fish Oil) 300-500 mg cap Take  by mouth. · multivitamin (ONE A DAY) tablet Take 1 Tab by mouth daily. Allergies   Allergen Reactions   · Codeine Rash     Past Medical History:   Diagnosis Date   · IBS (irritable bowel syndrome)   · Thromboembolus (HCC)     Past Surgical History:   Procedure Laterality Date   · HX HYSTERECTOMY   · HX ORTHOPAEDIC   knee and foot surgery     No family history on file. Social History     Tobacco Use   · Smoking status: Light Tobacco Smoker   Packs/day: 0.50   Types: Cigarettes, Pipe   · Smokeless tobacco: Never Used   · Tobacco comment: Kaitlynn Hester 2018 (occasionally vape 1-3 times a month)   Substance Use Topics   · Alcohol use: Yes   Comment: 2 a month         Review of Systems: Review of all other systems otherwise negative. Constitutional: Negative for fever, chills, weight loss, malaise/fatigue. HEENT: Negative for nosebleeds, vision changes. Respiratory: Negative for cough, hemoptysis   Cardiovascular: Negative for chest pain, + palpitations, no orthopnea, claudication, leg swelling, syncope, and PND. + DE LEON   Gastrointestinal: Negative for nausea, vomiting, diarrhea, blood in stool and melena. Genitourinary: Negative for dysuria, and hematuria. Musculoskeletal: Negative for myalgias, arthralgia. Skin: Negative for rash. Heme: Does not bleed or bruise easily. Neurological: Negative for speech change and focal weakness.       Objective:     Visit Vitals  /70 (BP 1 Location: Right arm, BP Patient Position: Sitting, BP Cuff Size: Adult)   Pulse (!) 50   Resp 16   Ht 5' 2\" (1.575 m)   Wt 140 lb (63.5 kg)   SpO2 99%   BMI 25.61 kg/m²         Physical Exam:   Constitutional: Well-developed and well-nourished. No respiratory distress. Head: Normocephalic and atraumatic. Eyes: Pupils are equal, round. ENT: Hearing grossly normal.   Neck: Supple. No JVD present. Cardiovascular: Slow rate, irregular rhythm. Exam reveals no gallop and no friction rub. No murmur heard. Pulmonary/Chest: Effort normal and breath sounds normal. No wheezes. Abdominal: Soft, no tenderness. Musculoskeletal: Moves extremities independently.  Normal gait. Vasc/lymphatic: No edema. Neurological: Alert,oriented. Skin: Skin is warm and dry. Psychiatric: Normal mood and affect. Behavior is normal. Judgment and thought content normal.       EKG (02/26/2021): Sinus rhythm with sinus arrhythmia. Assessment/Plan:     Imaging/Studies:   Stress echo (06/10/2021): Normal.     Holter (05/2021): HR  bpm, avg 59 bpm.  PACs about 2800 or 3.37%, 2 PVCs.           ICD-10-CM ICD-9-CM    1. PAC (premature atrial contraction)  I49.1 427.61    2. Irregular heart beat  I49.9 427.9    3.  History of DVT (deep vein thrombosis)  Z86.718 V12.51    4. Sinus bradycardia  R00.1 427.89    5. Heart palpitations  R00.2 785.1          PACs: [place] 3.37% on holter monitor.  Symptomatic with palpitations, unable to tolerate BB or CCB.  Ablation unlikely to be successful unless she develops more sustained arrhythmia; discussed that she could degenerate into atrial tachycardia, atrial fibrillation.    She will not tolerate beta blocker   She did not tolerate cardizem  Will try flecainide 50 mg po bid for now; if unable to tolerate at that dose, could consider decreasing to 25 mg po bid.  Narrow QRS at baseline.  Stress echo unremarkable. Other antiarrhythmic drugs would cause sinus rate slower    Sinus bradycardia: 58.8% burden noted on holter monitor.  Able to exercise, no lightheadedness or syncope. Feel less palpitation when heart rate is faster because PAC would be overdriven      Follow up with Dr. Tor Cavazos in 6 months. Micheal Clark will call sooner in the interim for new/worsening issues. She will see me if arrhythmia is not controlled or need ablation  I would recommend Dr Tor Cavazos consider full dedicated echo as she thinks this was related to covid vaccine and myocarditis could be a result    Future Appointments   Date Time Provider Lamar Garcia   3/7/2022 11:20 AM York Kussmaul, MD [facility]  AMB         Thank you for involving me in this patient's care and please call with further concerns or questions. Dany Olmos M.D.    Electrophysiology/Cardiology   [facility] [place]   [address]                     [address] [facility], [address] [place] [address]   [phone]                                        [phone]

## 2021-09-07 NOTE — PROGRESS NOTES
Cardiac Electrophysiology OFFICE Consultation Note     Subjective:      Tariq Terry is a 52 y.o. patient who is seen for evaluation/management of PACs & PSVT. She was kindly referred by Dr. Kylie Miranda.    She notes daily \"strong\" & irregular beats. Also with DE LEON. States this began after her COVID vaccine, no longer as pronounced, but hasn't diminished in frequency. Doesn't notice as much when exercising, but isn't able to exercise as much as she used to. Holter showed 3% PACs. Stress echo was negative. Unable to tolerate diltiazem ER & Toprol XL. HR & BP both trend low. Prior DVT, but no longer on anticoagulant. Previous:  History of DVT post knee surgery. Palpitations, likely PVCs after 3rd pregnancy. Vapes. Problem List  Date Reviewed: 5/10/2021    None          Current Outpatient Medications   Medication Sig Dispense Refill    acyclovir (ZOVIRAX) 400 mg tablet TAKE 1 TABLET BY MOUTH EVERY 12 HOURS      fish oil-omega-3 fatty acids (Fish Oil) 300-500 mg cap Take  by mouth.  multivitamin (ONE A DAY) tablet Take 1 Tab by mouth daily.  TURMERIC PO Take  by mouth. (Patient not taking: Reported on 9/7/2021)      glucosamine (Glucosamine Relief) 1,000 mg tab Take 3,000 mg by mouth. (Patient not taking: Reported on 9/7/2021)      b complex vitamins tablet Take 1 Tab by mouth daily. (Patient not taking: Reported on 9/7/2021)       Allergies   Allergen Reactions    Codeine Rash     Past Medical History:   Diagnosis Date    IBS (irritable bowel syndrome)     Thromboembolus (HCC)      Past Surgical History:   Procedure Laterality Date    HX HYSTERECTOMY      HX ORTHOPAEDIC      knee and foot surgery     No family history on file.   Social History     Tobacco Use    Smoking status: Light Tobacco Smoker     Packs/day: 0.50     Types: Cigarettes, Pipe    Smokeless tobacco: Never Used    Tobacco comment: QuitCig 2018 (occasionally vape 1-3 times a month)   Substance Use Topics  Alcohol use: Yes     Comment: 2 a month        Review of Systems: Review of all other systems otherwise negative. Constitutional: Negative for fever, chills, weight loss, malaise/fatigue. HEENT: Negative for nosebleeds, vision changes. Respiratory: Negative for cough, hemoptysis  Cardiovascular: Negative for chest pain, + palpitations, no orthopnea, claudication, leg swelling, syncope, and PND. + DE LEON  Gastrointestinal: Negative for nausea, vomiting, diarrhea, blood in stool and melena. Genitourinary: Negative for dysuria, and hematuria. Musculoskeletal: Negative for myalgias, arthralgia. Skin: Negative for rash. Heme: Does not bleed or bruise easily. Neurological: Negative for speech change and focal weakness. Objective:     Visit Vitals  /70 (BP 1 Location: Right arm, BP Patient Position: Sitting, BP Cuff Size: Adult)   Pulse (!) 50   Resp 16   Ht 5' 2\" (1.575 m)   Wt 140 lb (63.5 kg)   SpO2 99%   BMI 25.61 kg/m²      Physical Exam:   Constitutional: Well-developed and well-nourished. No respiratory distress. Head: Normocephalic and atraumatic. Eyes: Pupils are equal, round. ENT: Hearing grossly normal.  Neck: Supple. No JVD present. Cardiovascular: Slow rate, irregular rhythm. Exam reveals no gallop and no friction rub. No murmur heard. Pulmonary/Chest: Effort normal and breath sounds normal. No wheezes. Abdominal: Soft, no tenderness. Musculoskeletal: Moves extremities independently. Normal gait. Vasc/lymphatic: No edema. Neurological: Alert,oriented. Skin: Skin is warm and dry. Psychiatric: Normal mood and affect. Behavior is normal. Judgment and thought content normal.      EKG (02/26/2021): Sinus rhythm with sinus arrhythmia. Assessment/Plan:     Imaging/Studies:  Stress echo (06/10/2021): Normal.    Holter (05/2021): HR  bpm, avg 59 bpm.  Tachy 2.6%, johanna 58.8%. PACs 3.37%, 2 PVCs. ICD-10-CM ICD-9-CM    1.  Irregular heart beat  I49.9 427.9 2. History of DVT (deep vein thrombosis)  Z86.718 V12.51    3. PAC (premature atrial contraction)  I49.1 427.61    4. Sinus bradycardia  R00.1 427.89    5. Heart palpitations  R00.2 785.1        PACs: [place] 3.37% on holter monitor. Symptomatic with palpitations, unable to tolerate BB or CCB. Ablation unlikely to be successful unless she develops more sustained arrhythmia; discussed that she will eventually develop atrial fibrillation. Will try flecainide 50 mg po bid for now; if unable to tolerate at that dose, could consider decreasing to 25 mg po bid. Narrow QRS at baseline. Stress echo unremarkable. Sinus bradycardia: 58.8% burden noted on holter monitor. Able to exercise, no lightheadedness or syncope. Follow up with Dr. Ramona Henson in 6 months. She will call sooner in the interim for new/worsening issues. No future appointments. Thank you for involving me in this patient's care and please call with further concerns or questions. Paul Montejo M.D.   Electrophysiology/Cardiology  [facility] [place]  [address] [place] [address]  [address]  [phone]

## 2021-09-07 NOTE — PATIENT INSTRUCTIONS
Start Flecainide 50 mg twice daily    You will need to follow up in clinic with Dr. Espinal Resides in 6 months.

## 2021-09-08 ENCOUNTER — TELEPHONE (OUTPATIENT)
Dept: CARDIOLOGY CLINIC | Age: 51
End: 2021-09-08

## 2021-09-08 DIAGNOSIS — I49.9 IRREGULAR HEART BEAT: Primary | ICD-10-CM

## 2021-09-08 NOTE — TELEPHONE ENCOUNTER
Anna So  You Just now (1:54 PM)   KM  Spoke to patient in regards to scheduling an echo. She stated that she was under the impression that she only needed the echo if the new medication that was prescribed did not work. She asked that I get confirmation before scheduling. Please advise. I will glad to call her back.  Thanks

## 2021-09-09 NOTE — TELEPHONE ENCOUNTER
Returned call to patient. Two patient indentifiers verified. Pt was informed of the message. Pt will call back if she needs the echo. Pt verbalized understanding and denies any further questions at this time.      Future Appointments   Date Time Provider Lamar Garcia   9/13/2021  5:00 PM [facility] MRI 1 [facility]   9/13/2021  5:45 PM [facility] MRI 1 [facility]   3/7/2022 11:20 AM MD JOANIE Conteh AMB

## 2021-09-09 NOTE — TELEPHONE ENCOUNTER
Kimi Sinclair MD  You; Silvano Jackson MD 11 hours ago (10:12 PM)   SW  Got it ok. In that case, would hold off on seeing how she does with flecainide. thx    Message text    Silvano Jackson MD  You; Kimi Sinclair MD 15 hours ago (5:58 PM)   MN  I did tell her that if flecainide is not controlling her arrhythmia then we need to look more into it and echo is cheaper than cardiac MRI as she thinks COVID vaccine caused this problem    Message text    Kimi Sinclair MD  [place]; Silvano Jackson MD 16 hours ago (4:52 PM)   SW  Per Dr. Octavio Jean 9/7/21 (yesterday) office visit:   \"I would recommend Dr Katharyn Schilder consider full dedicated echo as she thinks this was related to covid vaccine and myocarditis could be a result\"     The purpose of the echo would be to evaluate for possible myocarditis. So I would get the echo based on Dr. Octavio Jean recommendation.  thx

## 2021-09-13 ENCOUNTER — HOSPITAL ENCOUNTER (OUTPATIENT)
Dept: MRI IMAGING | Age: 51
Discharge: HOME | End: 2021-09-13
Attending: OTOLARYNGOLOGY
Payer: COMMERCIAL

## 2021-09-13 ENCOUNTER — APPOINTMENT (OUTPATIENT)
Dept: MRI IMAGING | Age: 51
End: 2021-09-13
Attending: OTOLARYNGOLOGY

## 2021-09-13 DIAGNOSIS — H93.A3 PULSATILE TINNITUS, BILATERAL: ICD-10-CM

## 2021-09-13 PROCEDURE — 70544 MR ANGIOGRAPHY HEAD W/O DYE: CPT

## 2021-10-28 RX ORDER — FLECAINIDE ACETATE 50 MG/1
50 TABLET ORAL 2 TIMES DAILY
Qty: 180 TABLET | Refills: 1 | Status: SHIPPED | OUTPATIENT
Start: 2021-10-28 | End: 2021-12-13

## 2021-10-28 NOTE — TELEPHONE ENCOUNTER
Request for Flecainide 50 mg BID. Last office visit 9/7/21, next office visit 3/7/22. Refills per verbal order from Dr. Maurizio Aleman.

## 2021-12-13 ENCOUNTER — DOCUMENTATION ONLY (OUTPATIENT)
Dept: CARDIOLOGY CLINIC | Age: 51
End: 2021-12-13

## 2021-12-13 RX ORDER — FLECAINIDE ACETATE 100 MG/1
100 TABLET ORAL 2 TIMES DAILY
Qty: 180 TABLET | Refills: 1 | Status: SHIPPED | OUTPATIENT
Start: 2021-12-13 | End: 2021-12-28

## 2021-12-13 NOTE — PROGRESS NOTES
Verified patient with two types of identifiers. Scheduled patient for sooner follow up with Dr. Diane Jacobs. Verified patient will increase Flecainide to 100 mg BID. Will ask device clinic to send 30 day loop to patient. Patient verbalized understanding and will call with any other questions.       Future Appointments   Date Time Provider Lamar Garcia   12/28/2021  8:20 AM MD JOANIE Morales AMB   3/7/2022 11:20 AM MD JOANIE Cook AMB

## 2021-12-13 NOTE — PROGRESS NOTES
Dr Lux Nayak asked me if she can be seen for PAF.     I agree with increasing flecainide to 100 mg bid and I also recommend external loop recorder if her symptoms not better to confirm AF  I recommend PAF ablation if she cannot be symptom free with 100 mg bid flecainide but if PACs, ablation will not be successful

## 2021-12-13 NOTE — TELEPHONE ENCOUNTER
Requested Prescriptions     Signed Prescriptions Disp Refills    flecainide (TAMBOCOR) 100 mg tablet 180 Tablet 1     Sig: Take 1 Tablet by mouth two (2) times a day.      Authorizing Provider: Cristal Álvarez     Ordering User: Rachel Pineda       Per Dr. Anupama Deras   Date Time Provider Lamar Garcia   3/7/2022 11:20 AM MD JOANIE Britton AMB

## 2021-12-28 ENCOUNTER — OFFICE VISIT (OUTPATIENT)
Dept: CARDIOLOGY CLINIC | Age: 51
End: 2021-12-28
Payer: COMMERCIAL

## 2021-12-28 VITALS
DIASTOLIC BLOOD PRESSURE: 62 MMHG | SYSTOLIC BLOOD PRESSURE: 106 MMHG | HEIGHT: 62 IN | WEIGHT: 142 LBS | BODY MASS INDEX: 26.13 KG/M2 | HEART RATE: 56 BPM

## 2021-12-28 DIAGNOSIS — I49.9 IRREGULAR HEART BEAT: ICD-10-CM

## 2021-12-28 DIAGNOSIS — R00.2 HEART PALPITATIONS: Primary | ICD-10-CM

## 2021-12-28 DIAGNOSIS — I49.1 PAC (PREMATURE ATRIAL CONTRACTION): ICD-10-CM

## 2021-12-28 DIAGNOSIS — R06.02 SHORTNESS OF BREATH: ICD-10-CM

## 2021-12-28 PROCEDURE — 93000 ELECTROCARDIOGRAM COMPLETE: CPT | Performed by: INTERNAL MEDICINE

## 2021-12-28 PROCEDURE — 99214 OFFICE O/P EST MOD 30 MIN: CPT | Performed by: INTERNAL MEDICINE

## 2021-12-28 RX ORDER — FLECAINIDE ACETATE 50 MG/1
50 TABLET ORAL 2 TIMES DAILY
Qty: 90 TABLET | Refills: 2
Start: 2021-12-28 | End: 2022-01-04 | Stop reason: SDUPTHER

## 2021-12-29 NOTE — PROGRESS NOTES
Cardiac Electrophysiology OFFICE Consultation Note     Subjective:       Chandni Monsivais is a 54 y.o. female patient who is seen for evaluation/management of PACs & PSVT. Daniel Jasen was kindly referred by Dr. Antwon Hoffman.     She notes daily \"strong\" & irregular beats. Ramirez Louis with [employer]. Amisha Torrez this began after her COVID vaccine    Flecainide 50 mg twice a day was initially effective. Recently the dose was increased to 100 mg twice a day to control her symptoms more. The patient uses [employer] and said it had reported that she has paroxysmal atrial fibrillation    Holter in the past showed 3% PACs, few PVC.  Stress echo was reported negative for ischemia by Dr Antwon Hoffman. Unable to tolerate diltiazem ER & Toprol XL.  HR & BP both trend low. She had headache with it    Prior DVT, but no longer on anticoagulant. Previous:   History of DVT post knee surgery. Palpitations, likely PVCs after 3rd pregnancy. [employer]. Works in [employer] with Dr Antwon Hoffman and Abran Dangelo       Current Outpatient Medications   Medication Sig Dispense Refill   · flecainide (TAMBOCOR) 50 mg tablet Take 1 Tablet by mouth two (2) times a day. 180 Tablet 1   · acyclovir (ZOVIRAX) 400 mg tablet TAKE 1 TABLET BY MOUTH EVERY 12 HOURS   · fish oil-omega-3 fatty acids (Fish Oil) 300-500 mg cap Take  by mouth. · multivitamin (ONE A DAY) tablet Take 1 Tab by mouth daily. Allergies   Allergen Reactions   · Codeine Rash     Past Medical History:   Diagnosis Date   · IBS (irritable bowel syndrome)   · Thromboembolus (HCC)     Past Surgical History:   Procedure Laterality Date   · HX HYSTERECTOMY   · HX ORTHOPAEDIC   knee and foot surgery     No family history on file.    Social History     Tobacco Use   · Smoking status: Light Tobacco Smoker   Packs/day: 0.50   Types: Cigarettes, Pipe   · Smokeless tobacco: Never Used   · Tobacco comment: QuitCig 2018 (occasionally vape 1-3 times a month)   Substance Use Topics   · Alcohol use: Yes   Comment: 2 a month     Review of Systems: Review of all other systems otherwise negative. Constitutional: Negative for fever, chills, weight loss, malaise/fatigue. HEENT: Negative for nosebleeds, vision changes. Respiratory: Negative for cough, hemoptysis   Cardiovascular: Negative for chest pain, + palpitations, no orthopnea, claudication, leg swelling, syncope, and PND. + DE LEON   Gastrointestinal: Negative for nausea, vomiting, diarrhea, blood in stool and melena. Genitourinary: Negative for dysuria, and hematuria. Musculoskeletal: Negative for myalgias, arthralgia. Skin: Negative for rash. Heme: Does not bleed or bruise easily. Neurological: Negative for speech change and focal weakness.       Objective:     Visit Vitals  /62   Pulse (!) 56   Ht 5' 2\" (1.575 m)   Wt 142 lb (64.4 kg)   BMI 25.97 kg/m²         Physical Exam:   Constitutional: Well-developed and well-nourished. No respiratory distress. Head: Normocephalic and atraumatic. Eyes: Pupils are equal, round. ENT: Hearing grossly normal.   Neck: Supple. No JVD present. Cardiovascular: Slow rate, regular rhythm. Exam reveals no gallop and no friction rub. No murmur heard. Pulmonary/Chest: Effort normal and breath sounds normal. No wheezes. Abdominal: Soft, no tenderness. Musculoskeletal: Moves extremities independently.  Normal gait. Vasc/lymphatic: No edema. Neurological: Alert,oriented. Skin: Skin is warm and dry. Psychiatric: Normal mood and affect. Behavior is normal. Judgment and thought content normal.       EKG (02/26/2021): Sinus rhythm with sinus arrhythmia. Assessment/Plan:     Imaging/Studies:   Stress echo (06/10/2021): Normal.     Holter (05/2021): HR  bpm, avg 59 bpm.  PACs about 2800 or 3.37%, 2 PVCs.           ICD-10-CM ICD-9-CM    1. Heart palpitations  R00.2 785.1    2. PAC (premature atrial contraction)  I49.1 427.61 AMB POC EKG ROUTINE W/ 12 LEADS, INTER & REP   3.  Irregular heart beat  I49.9 427.9 AMB POC EKG ROUTINE W/ 12 LEADS, INTER & REP   4. Shortness of breath  R06.02 786.05      ECG mild sinus bradycardia with IVCD    PACs/PAT Symptomatic with palpitations, unable to tolerate BB or CCB.  Ablation unlikely to be successful unless she develops more sustained arrhythmia; discussed that she could degenerate into atrial tachycardia, atrial fibrillation.  She said she has used [employer] and it said she had paroxysmal atrial fibrillation  I recommend her use the 30-day event recorder to confirm  She will not tolerate beta blocker   She did not tolerate cardizem  Will reduce flecainide 50 mg po bid for now since the patient thought with the 100 mg twice a day, her palpitation was more controlled but she felt more easily fatigued with exercise  If she has PAC, this has to be treated with medication  If she has confirmed paroxysmal atrial fibrillation, she will contact me again to consider ablation    Sinus bradycardia: 58.8% burden noted on holter monitor. Able to exercise, no lightheadedness or syncope but has to be at the 50 mg twice a day flecainide and cannot tolerate AV kierra blocker. She will get cardiac MRI after event recorder and before PAF ablation  She thought her arrhythmia was brought on by Covid vaccination and may have had myocarditis    Future Appointments   Date Time Provider Lamar Garcia   3/7/2022 11:20 AM MD JOANIE Maxwell AMB         Thank you for involving me in this patient's care and please call with further concerns or questions. Lori Herrera M.D.    Electrophysiology/Cardiology   [facility] [place]   [place] [address]                     [address] [facility], [address] [place] [address]   [phone]                                        [phone]

## 2021-12-29 NOTE — H&P (VIEW-ONLY)
Cardiac Electrophysiology OFFICE Consultation Note     Subjective:       Bethanie Muhammad is a 54 y.o. female patient who is seen for evaluation/management of PACs & PSVT. Troy Varela was kindly referred by Dr. Annelise Becerra.     She notes daily \"strong\" & irregular beats. Heladio Santana with DE LEON. Sejal Kumar this began after her COVID vaccine    Flecainide 50 mg twice a day was initially effective. Recently the dose was increased to 100 mg twice a day to control her symptoms more. The patient uses iWatch and said it had reported that she has paroxysmal atrial fibrillation    Holter in the past showed 3% PACs, few PVC.  Stress echo was reported negative for ischemia by Dr Annelise Becerra. Unable to tolerate diltiazem ER & Toprol XL.  HR & BP both trend low. She had headache with it    Prior DVT, but no longer on anticoagulant. Previous:   History of DVT post knee surgery. Palpitations, likely PVCs after 3rd pregnancy. Vapes. Works in [employer] with Dr Annelise Becerra and Marialuisa Caceres       Current Outpatient Medications   Medication Sig Dispense Refill   · flecainide (TAMBOCOR) 50 mg tablet Take 1 Tablet by mouth two (2) times a day. 180 Tablet 1   · acyclovir (ZOVIRAX) 400 mg tablet TAKE 1 TABLET BY MOUTH EVERY 12 HOURS   · fish oil-omega-3 fatty acids (Fish Oil) 300-500 mg cap Take  by mouth. · multivitamin (ONE A DAY) tablet Take 1 Tab by mouth daily. Allergies   Allergen Reactions   · Codeine Rash     Past Medical History:   Diagnosis Date   · IBS (irritable bowel syndrome)   · Thromboembolus (HCC)     Past Surgical History:   Procedure Laterality Date   · HX HYSTERECTOMY   · HX ORTHOPAEDIC   knee and foot surgery     No family history on file.    Social History     Tobacco Use   · Smoking status: Light Tobacco Smoker   Packs/day: 0.50   Types: Cigarettes, Pipe   · Smokeless tobacco: Never Used   · Tobacco comment: QuitCig 2018 (occasionally vape 1-3 times a month)   Substance Use Topics   · Alcohol use: Yes   Comment: 2 a month     Review of Systems: Review of all other systems otherwise negative. Constitutional: Negative for fever, chills, weight loss, malaise/fatigue. HEENT: Negative for nosebleeds, vision changes. Respiratory: Negative for cough, hemoptysis   Cardiovascular: Negative for chest pain, + palpitations, no orthopnea, claudication, leg swelling, syncope, and PND. + DE LEON   Gastrointestinal: Negative for nausea, vomiting, diarrhea, blood in stool and melena. Genitourinary: Negative for dysuria, and hematuria. Musculoskeletal: Negative for myalgias, arthralgia. Skin: Negative for rash. Heme: Does not bleed or bruise easily. Neurological: Negative for speech change and focal weakness.       Objective:     Visit Vitals  /62   Pulse (!) 56   Ht 5' 2\" (1.575 m)   Wt 142 lb (64.4 kg)   BMI 25.97 kg/m²         Physical Exam:   Constitutional: Well-developed and well-nourished. No respiratory distress. Head: Normocephalic and atraumatic. Eyes: Pupils are equal, round. ENT: Hearing grossly normal.   Neck: Supple. No JVD present. Cardiovascular: Slow rate, regular rhythm. Exam reveals no gallop and no friction rub. No murmur heard. Pulmonary/Chest: Effort normal and breath sounds normal. No wheezes. Abdominal: Soft, no tenderness. Musculoskeletal: Moves extremities independently.  Normal gait. Vasc/lymphatic: No edema. Neurological: Alert,oriented. Skin: Skin is warm and dry. Psychiatric: Normal mood and affect. Behavior is normal. Judgment and thought content normal.       EKG (02/26/2021): Sinus rhythm with sinus arrhythmia. Assessment/Plan:     Imaging/Studies:   Stress echo (06/10/2021): Normal.     Holter (05/2021): HR  bpm, avg 59 bpm.  PACs about 2800 or 3.37%, 2 PVCs.           ICD-10-CM ICD-9-CM    1. Heart palpitations  R00.2 785.1    2. PAC (premature atrial contraction)  I49.1 427.61 AMB POC EKG ROUTINE W/ 12 LEADS, INTER & REP   3.  Irregular heart beat  I49.9 427.9 AMB POC EKG ROUTINE W/ 12 LEADS, INTER & REP   4. Shortness of breath  R06.02 786.05      ECG mild sinus bradycardia with IVCD    PACs/PAT Symptomatic with palpitations, unable to tolerate BB or CCB.  Ablation unlikely to be successful unless she develops more sustained arrhythmia; discussed that she could degenerate into atrial tachycardia, atrial fibrillation.  She said she has used iWatch and it said she had paroxysmal atrial fibrillation  I recommend her use the 30-day event recorder to confirm  She will not tolerate beta blocker   She did not tolerate cardizem  Will reduce flecainide 50 mg po bid for now since the patient thought with the 100 mg twice a day, her palpitation was more controlled but she felt more easily fatigued with exercise  If she has PAC, this has to be treated with medication  If she has confirmed paroxysmal atrial fibrillation, she will contact me again to consider ablation    Sinus bradycardia: 58.8% burden noted on holter monitor. Able to exercise, no lightheadedness or syncope but has to be at the 50 mg twice a day flecainide and cannot tolerate AV kierra blocker. She will get cardiac MRI after event recorder and before PAF ablation  She thought her arrhythmia was brought on by Covid vaccination and may have had myocarditis    Future Appointments   Date Time Provider Lamar Garcia   3/7/2022 11:20 AM MD JOANIE Kilpatrick AMB         Thank you for involving me in this patient's care and please call with further concerns or questions. Wendy Fishman M.D.    Electrophysiology/Cardiology   [facility] [place]   [facility], [place] [address]                     [address] [facility], [address] [place] [address]   [phone]                                        [phone]

## 2022-01-02 ENCOUNTER — DOCUMENTATION ONLY (OUTPATIENT)
Dept: CARDIOLOGY CLINIC | Age: 52
End: 2022-01-02

## 2022-01-03 ENCOUNTER — PATIENT MESSAGE (OUTPATIENT)
Dept: CARDIOLOGY CLINIC | Age: 52
End: 2022-01-03

## 2022-01-03 NOTE — PROGRESS NOTES
Per Dr. Ricardo Pederson do not have the time so if during sleeping then no pacer recommended.  I had told her to reduce flecainide again to 50 mg bid after visit so flecainide may not be all down yet during these. \"    Verified patient with two types of identifiers. Notified of loop monitor findings. Patient states she thinks these episodes did wake her up; however, she is able to get up and get a glass of water and feels better. Patient reports feeling like she cant catch her breath at that moment. Patient body normally wakes her up around 4:15 am so she was \"awake\" but not out of bed yet. Notified of MD recommendation that the increased dose of Flecainide might not be down yet during these times. Over all feeling better on the decrease dose of Flecainide 50 mg BID.  Will update MD     Addendum I reviewed the loop recorder strip on 1/2/2022 at 4:54 AM, she had 3-second sinus pause and occasional PAC  I recommend to continue to monitor after the dose of flecainide has been reduced

## 2022-01-03 NOTE — PROGRESS NOTES
Will continue to monitor via loop recorder to see if pauses continue despite decrease in flecainide.

## 2022-01-04 ENCOUNTER — DOCUMENTATION ONLY (OUTPATIENT)
Dept: CARDIOLOGY CLINIC | Age: 52
End: 2022-01-04

## 2022-01-04 RX ORDER — FLECAINIDE ACETATE 50 MG/1
25 TABLET ORAL 2 TIMES DAILY
Qty: 45 TABLET | Refills: 1 | Status: ON HOLD
Start: 2022-01-04 | End: 2022-01-07

## 2022-01-04 NOTE — PROGRESS NOTES
Strips of loop monitor finally came together for me to review  12/31/21-1/3/22 4-7 am episodes of sinus pauses 3-5 seconds  Many are asymptomatic  Flecainide has been reduced  Will ask her to stop flecainide for now while continuing using monitor

## 2022-01-06 ENCOUNTER — DOCUMENTATION ONLY (OUTPATIENT)
Dept: CARDIOLOGY CLINIC | Age: 52
End: 2022-01-06

## 2022-01-06 ENCOUNTER — TELEPHONE (OUTPATIENT)
Dept: CARDIOLOGY CLINIC | Age: 52
End: 2022-01-06

## 2022-01-06 NOTE — PROGRESS NOTES
She called and said she had negative COVID test and is symptomatic with bradycardia despite stopping flecainide  She will need medication for PAC and ? PAF so dual chamber pacer is indicated   Await approval from her insurance    Future Appointments   Date Time Provider Lamar Garcia   1/14/2022 11:00 AM WOUND CHECKS, HARPREET FRANKS AMB   1/14/2022 11:20 AM PACEMAKER3, HARPREET FRANKS AMB   3/7/2022 11:20 AM MD JOANIE Aguilar BS AMB

## 2022-01-06 NOTE — TELEPHONE ENCOUNTER
Dr. Chanel Paige informed of negative covid 19 test.  Per Dr. Chanel Paige:  Can continue to monitor heart rate via loop monitor, but he anticipates need for pacemaker because she will need to resume flecainide. Identifiers x 2. She would like to proceed with device implant tomorrow. Verbal instructions given to patient. Written instructions sent via [employer].      Requested that authorization department proceed with insurance approval.

## 2022-01-06 NOTE — TELEPHONE ENCOUNTER
Patient complains of shortness of breath, she is having a hard time talking. Yesterday was the worse for her. She states she has been called daily for the last four days she has had the loop monitor due to episodes.         PHONE:[phone]        Transferred to Octavio Sanderson

## 2022-01-06 NOTE — TELEPHONE ENCOUNTER
Verified patient with two types of identifiers. Patient states that she is not feeling well today. Complaints of SOB, chest pressure, headache, and \"everything feels heavy. \" Requested patient come into office today for evaluation. Patient then reports that her son who lives with her tested positive for COVID on Sunday. Discussed with Dr. Rosaline Turpin who recommends patient go to urgent care to be tested for COVID. Patient states she feels this is heart related and will go to [facility].  Will notify MD

## 2022-01-06 NOTE — TELEPHONE ENCOUNTER
Patient called to let the nurse know her covid test was negative and would like to know her next steps, please advise          [phone]

## 2022-01-07 ENCOUNTER — TELEPHONE (OUTPATIENT)
Dept: CARDIOLOGY CLINIC | Age: 52
End: 2022-01-07

## 2022-01-07 ENCOUNTER — APPOINTMENT (OUTPATIENT)
Dept: GENERAL RADIOLOGY | Age: 52
End: 2022-01-07
Attending: NURSE PRACTITIONER
Payer: COMMERCIAL

## 2022-01-07 ENCOUNTER — HOSPITAL ENCOUNTER (OUTPATIENT)
Age: 52
Setting detail: OUTPATIENT SURGERY
Discharge: HOME | End: 2022-01-07
Attending: INTERNAL MEDICINE | Admitting: INTERNAL MEDICINE
Payer: COMMERCIAL

## 2022-01-07 VITALS
HEIGHT: 61 IN | BODY MASS INDEX: 25.68 KG/M2 | SYSTOLIC BLOOD PRESSURE: 98 MMHG | TEMPERATURE: 97.6 F | HEART RATE: 71 BPM | WEIGHT: 136 LBS | DIASTOLIC BLOOD PRESSURE: 78 MMHG | RESPIRATION RATE: 15 BRPM | OXYGEN SATURATION: 99 %

## 2022-01-07 DIAGNOSIS — R00.1 BRADYCARDIA: ICD-10-CM

## 2022-01-07 DIAGNOSIS — I45.5 SINUS PAUSE: ICD-10-CM

## 2022-01-07 DIAGNOSIS — Z95.0 PACEMAKER: ICD-10-CM

## 2022-01-07 DIAGNOSIS — I49.5 SICK SINUS SYNDROME (HCC): ICD-10-CM

## 2022-01-07 PROBLEM — I95.0 IDIOPATHIC HYPOTENSION: Status: ACTIVE | Noted: 2022-01-07

## 2022-01-07 PROBLEM — I49.1 PREMATURE ATRIAL CONTRACTIONS: Status: ACTIVE | Noted: 2022-01-07

## 2022-01-07 LAB
ANION GAP SERPL CALC-SCNC: 6 MMOL/L (ref 5–15)
BASOPHILS # BLD: 0 K/UL (ref 0–0.1)
BASOPHILS NFR BLD: 1 % (ref 0–1)
BUN SERPL-MCNC: 15 MG/DL (ref 6–20)
BUN/CREAT SERPL: 24 (ref 12–20)
CALCIUM SERPL-MCNC: 8.7 MG/DL (ref 8.5–10.1)
CHLORIDE SERPL-SCNC: 108 MMOL/L (ref 97–108)
CO2 SERPL-SCNC: 27 MMOL/L (ref 21–32)
CREAT SERPL-MCNC: 0.62 MG/DL (ref 0.55–1.02)
DIFFERENTIAL METHOD BLD: NORMAL
EOSINOPHIL # BLD: 0.1 K/UL (ref 0–0.4)
EOSINOPHIL NFR BLD: 1 % (ref 0–7)
ERYTHROCYTE [DISTWIDTH] IN BLOOD BY AUTOMATED COUNT: 12.6 % (ref 11.5–14.5)
GLUCOSE SERPL-MCNC: 85 MG/DL (ref 65–100)
HCT VFR BLD AUTO: 39.2 % (ref 35–47)
HGB BLD-MCNC: 12.9 G/DL (ref 11.5–16)
IMM GRANULOCYTES # BLD AUTO: 0 K/UL (ref 0–0.04)
IMM GRANULOCYTES NFR BLD AUTO: 0 % (ref 0–0.5)
LYMPHOCYTES # BLD: 1.8 K/UL (ref 0.8–3.5)
LYMPHOCYTES NFR BLD: 34 % (ref 12–49)
MCH RBC QN AUTO: 29.7 PG (ref 26–34)
MCHC RBC AUTO-ENTMCNC: 32.9 G/DL (ref 30–36.5)
MCV RBC AUTO: 90.1 FL (ref 80–99)
MONOCYTES # BLD: 0.4 K/UL (ref 0–1)
MONOCYTES NFR BLD: 6 % (ref 5–13)
NEUTS SEG # BLD: 3.1 K/UL (ref 1.8–8)
NEUTS SEG NFR BLD: 58 % (ref 32–75)
NRBC # BLD: 0 K/UL (ref 0–0.01)
NRBC BLD-RTO: 0 PER 100 WBC
PLATELET # BLD AUTO: 260 K/UL (ref 150–400)
PMV BLD AUTO: 10.4 FL (ref 8.9–12.9)
POTASSIUM SERPL-SCNC: 3.6 MMOL/L (ref 3.5–5.1)
RBC # BLD AUTO: 4.35 M/UL (ref 3.8–5.2)
SODIUM SERPL-SCNC: 141 MMOL/L (ref 136–145)
WBC # BLD AUTO: 5.4 K/UL (ref 3.6–11)

## 2022-01-07 PROCEDURE — C1785 PMKR, DUAL, RATE-RESP: HCPCS | Performed by: INTERNAL MEDICINE

## 2022-01-07 PROCEDURE — C1893 INTRO/SHEATH, FIXED,NON-PEEL: HCPCS | Performed by: INTERNAL MEDICINE

## 2022-01-07 PROCEDURE — 77030032060 HC PWDR HEMSTAT ARISTA ASRB 3GM BARD -C: Performed by: INTERNAL MEDICINE

## 2022-01-07 PROCEDURE — 77030041075 HC DRSG AG OPTIFRM MDII -B: Performed by: INTERNAL MEDICINE

## 2022-01-07 PROCEDURE — 33208 INSRT HEART PM ATRIAL & VENT: CPT | Performed by: INTERNAL MEDICINE

## 2022-01-07 PROCEDURE — 85025 COMPLETE CBC W/AUTO DIFF WBC: CPT

## 2022-01-07 PROCEDURE — 77030022704 HC SUT VLOC COVD -B: Performed by: INTERNAL MEDICINE

## 2022-01-07 PROCEDURE — 71045 X-RAY EXAM CHEST 1 VIEW: CPT

## 2022-01-07 PROCEDURE — 99152 MOD SED SAME PHYS/QHP 5/>YRS: CPT | Performed by: INTERNAL MEDICINE

## 2022-01-07 PROCEDURE — 99153 MOD SED SAME PHYS/QHP EA: CPT | Performed by: INTERNAL MEDICINE

## 2022-01-07 PROCEDURE — C1898 LEAD, PMKR, OTHER THAN TRANS: HCPCS | Performed by: INTERNAL MEDICINE

## 2022-01-07 PROCEDURE — 80048 BASIC METABOLIC PNL TOTAL CA: CPT

## 2022-01-07 PROCEDURE — C1892 INTRO/SHEATH,FIXED,PEEL-AWAY: HCPCS | Performed by: INTERNAL MEDICINE

## 2022-01-07 PROCEDURE — 74011250636 HC RX REV CODE- 250/636: Performed by: INTERNAL MEDICINE

## 2022-01-07 PROCEDURE — A4565 SLINGS: HCPCS | Performed by: INTERNAL MEDICINE

## 2022-01-07 PROCEDURE — 77030018547 HC SUT ETHBND1 J&J -B: Performed by: INTERNAL MEDICINE

## 2022-01-07 PROCEDURE — 74011000250 HC RX REV CODE- 250: Performed by: INTERNAL MEDICINE

## 2022-01-07 PROCEDURE — 2709999900 HC NON-CHARGEABLE SUPPLY: Performed by: INTERNAL MEDICINE

## 2022-01-07 PROCEDURE — C1781 MESH (IMPLANTABLE): HCPCS | Performed by: INTERNAL MEDICINE

## 2022-01-07 DEVICE — LEAD 407652 CAPSUREFIX NOVUS US MRI
Type: IMPLANTABLE DEVICE | Status: FUNCTIONAL
Brand: CAPSUREFIX NOVUS MRI™ SURESCAN™

## 2022-01-07 DEVICE — IPG W1DR01 AZURE XT DR MRI USA
Type: IMPLANTABLE DEVICE | Status: FUNCTIONAL
Brand: AZURE™ XT DR MRI SURESCAN™

## 2022-01-07 DEVICE — ENVELOPE CMRM6133 ABSORB LRG MR
Type: IMPLANTABLE DEVICE | Status: FUNCTIONAL
Brand: TYRX™

## 2022-01-07 DEVICE — LEAD 407658 CAPSUREFIX NOVUS US MRI
Type: IMPLANTABLE DEVICE | Status: FUNCTIONAL
Brand: CAPSUREFIX NOVUS MRI™ SURESCAN™

## 2022-01-07 RX ORDER — SODIUM CHLORIDE 0.9 % (FLUSH) 0.9 %
5-40 SYRINGE (ML) INJECTION AS NEEDED
Status: DISCONTINUED | OUTPATIENT
Start: 2022-01-07 | End: 2022-01-07 | Stop reason: HOSPADM

## 2022-01-07 RX ORDER — SODIUM CHLORIDE 0.9 % (FLUSH) 0.9 %
5-40 SYRINGE (ML) INJECTION EVERY 8 HOURS
Status: DISCONTINUED | OUTPATIENT
Start: 2022-01-07 | End: 2022-01-07 | Stop reason: HOSPADM

## 2022-01-07 RX ORDER — FENTANYL CITRATE 50 UG/ML
INJECTION, SOLUTION INTRAMUSCULAR; INTRAVENOUS AS NEEDED
Status: DISCONTINUED | OUTPATIENT
Start: 2022-01-07 | End: 2022-01-07 | Stop reason: HOSPADM

## 2022-01-07 RX ORDER — FLECAINIDE ACETATE 50 MG/1
50 TABLET ORAL 2 TIMES DAILY
Qty: 60 TABLET | Refills: 3 | Status: SHIPPED
Start: 2022-01-07 | End: 2022-01-27 | Stop reason: ALTCHOICE

## 2022-01-07 RX ORDER — MIDAZOLAM HYDROCHLORIDE 1 MG/ML
INJECTION, SOLUTION INTRAMUSCULAR; INTRAVENOUS AS NEEDED
Status: DISCONTINUED | OUTPATIENT
Start: 2022-01-07 | End: 2022-01-07 | Stop reason: HOSPADM

## 2022-01-07 RX ORDER — ONDANSETRON 2 MG/ML
4 INJECTION INTRAMUSCULAR; INTRAVENOUS
Status: DISCONTINUED | OUTPATIENT
Start: 2022-01-07 | End: 2022-01-07 | Stop reason: HOSPADM

## 2022-01-07 RX ORDER — SODIUM CHLORIDE 0.9 % (FLUSH) 0.9 %
5-40 SYRINGE (ML) INJECTION AS NEEDED
Status: CANCELLED | OUTPATIENT
Start: 2022-01-07

## 2022-01-07 RX ORDER — ACETAMINOPHEN 325 MG/1
650 TABLET ORAL
Status: DISCONTINUED | OUTPATIENT
Start: 2022-01-07 | End: 2022-01-07 | Stop reason: HOSPADM

## 2022-01-07 RX ORDER — ACYCLOVIR 400 MG/1
400 TABLET ORAL 2 TIMES DAILY
COMMUNITY

## 2022-01-07 RX ORDER — SODIUM CHLORIDE 0.9 % (FLUSH) 0.9 %
5-40 SYRINGE (ML) INJECTION EVERY 8 HOURS
Status: CANCELLED | OUTPATIENT
Start: 2022-01-07

## 2022-01-07 NOTE — DISCHARGE INSTRUCTIONS
PATIENT INSTRUCTIONS POST-PACEMAKER IMPLANT  Resume flecainide 50 mg twice a day    1. No heavy lifting or exercises with the left arm for 4 weeks. This includes the following:  Do not raise arm above the shoulder level to comb hair, pull on clothes, etc... Do not use the affected arm to pull up or push up from a seated or laying  down position. Do not allow anyone else to pull on the affected arm. 2.  You may shower with your Aquacel chest dressing in place. You may remove your dressing in 1 week, or it can be removed in clinic at follow up if you prefer. 3.  Do not drive for 3 days. 4.  Call Dr. Ada Rosales at [phone] if you experience any of the following symptoms:  1. Redness at the pacemaker site  2. Swelling at or around the pacemaker or in the left arm  3. Pain around the pacemaker  4. Dizziness, lightheadedness, fainting spells  5. Lack of energy  6. Shortness of breath  7. Rapid heart rate  8. Chest or muscle twitches    5. Follow-up with Dr. Mahsa Brock office as scheduled below. Future Appointments   Date Time Provider Lamar Garcia   1/14/2022 11:00 AM WOUND CHECKS, HARPREET NAIR BS AMB   1/14/2022 11:20 AM PACEMAKER3, HARPREET FRANKS AMB   3/7/2022 11:20 AM MD JOANIE Fisher BS AMB     6. You may use over the counter pain medication (Tylenol 2 every 6 hours as needed) and ice pack for pain relief as needed. You may wear the sling as a reminder to keep your arm below the your shoulder. Ayla Nicolas M.D.  [facility]  Electrophysiology/Cardiology  [facility] [place]  [address] , [address]  [address]  [phone]

## 2022-01-07 NOTE — TELEPHONE ENCOUNTER
Giselle called from [facility] stated the patient is having her pacemaker put in today at 2:20p. Requesting to know the patients ejection fraction.          Phone: [phone]

## 2022-01-07 NOTE — INTERVAL H&P NOTE
Update History & Physical    The Patient's History and Physical of December 28, 2021 was reviewed with the patient and I examined the patient. There was several sinus pauses over 5 seconds despite stopping flecainide. She said she is too short of breaths and therefore I recommend dual chamber pacemaker and she agrees to proceed. The surgical site was confirmed by the patient and me. Plan:  The risk, benefits, expected outcome, and alternative to the recommended procedure have been discussed with the patient. Patient understands and wants to proceed with the procedure.     Electronically signed by Meeta Mcrae MD on 1/7/2022 at 1:33 PM

## 2022-01-07 NOTE — PROGRESS NOTES
Discharge Note      I called Rubi to confirm that pt. could sync her phone brooklynn for her new PP on Monday and he confirmed this was okay. (She was not provided a home transmitter.)    Patient was able to eat, drink, walk, void and dress independently. I removed their IV. I reviewed discharge instructions with them and they said they understood them. I took them to the front via wheelchair. We met the patient's ride at the front of the hospital. They drove away.

## 2022-01-07 NOTE — TELEPHONE ENCOUNTER
Per our authorization department, auth for dual chamber pacemaker is currently in review. Verified patient with two types of identifiers. Updated patient. Patient states she will call her insurance company as well to discuss procedure. Patient is due to report to admitting department by 12:30 pm. Will continue to monitor and update patient. Patient verbalized understanding and will call with any other questions.

## 2022-01-07 NOTE — TELEPHONE ENCOUNTER
Called and spoke with Hardy Spivey at [facility]. Dual Chamber PPM approved. Approval number [id number]. Verified patient with two types of identifiers. Notified patient of approval. Patient will arrive to admitting department by 12:30 pm today for procedure. Patient verbalized understanding and will call with any other questions.

## 2022-01-07 NOTE — PROGRESS NOTES
TRANSFER - IN REPORT:    Verbal report received from [facility] on Mary Lou Bullard  being received from procedural area for routine progression of care. Report consisted of patients Situation, Background, Assessment and Recommendations(SBAR). Information from the following report(s) Procedure Summary, MAR and Recent Results was reviewed with the receiving clinician. Opportunity for questions and clarification was provided. Assessment completed upon patients arrival to [address] [place] [address] and care assumed. Cardiac Cath Lab Recovery Arrival Note:    Mary Lou Bullard arrived to [facility] recovery area. Patient procedure= PPI, Dual. Patient on cardiac monitor, non-invasive blood pressure, SPO2 monitor. On RA. IV  of NS on pump at 75 ml/hr. Patient status doing well without problems. Patient is A&Ox 3. Patient reports no complaints. PROCEDURE SITE CHECK:    Procedure site:without any bleeding and hematoma, no pain/discomfort reported at procedure site. No change in patient status. Continue to monitor patient and status.

## 2022-01-07 NOTE — PROCEDURES
Cardiac Procedure Note   Patient: Ana Betancourt  MRN: [id number]  SSN: [id number]   YOB: 1970 Age: 54 y.o.   Sex: female    Date of Procedure: 1/7/2022   Pre-procedure Diagnosis: symptomatic sinus pause, sinus bradycardia PAC  Post-procedure Diagnosis: same  Procedure: Permanent Pacemaker Insertion  :  Dr. Felipe Patricio MD    Assistant(s):  None  Anesthesia: Moderate Sedation   Estimated Blood Loss: Less than 20 mL   Specimens Removed: None  Findings: RA anterior lead  RV apical lead  Complications: None   Implants: dual chamber [employer]onic pacemaker  Signed by:  Felipe Patricio MD  1/7/2022  3:19 PM

## 2022-01-07 NOTE — PROGRESS NOTES
Cardiac Cath Lab Recovery Arrival Note:      Casper Pitts arrived to Cardiac Cath Lab, Recovery Area. Staff introduced to patient. Patient identifiers verified with NAME and DATE OF BIRTH. Procedure verified with patient. Consent forms reviewed and signed by patient or authorized representative and verified. Allergies verified. Patient and family oriented to department. Patient and family informed of procedure and plan of care. Questions answered with review. Patient prepped for procedure, per orders from physician, prior to arrival.    Patient on cardiac monitor, non-invasive blood pressure, SPO2 monitor. On room air. Patient is A&Ox 4. Patient reports no pain. Patient in stretcher, in low position, with side rails up, call bell within reach, patient instructed to call if assistance as needed. Patient prep in: [address], [address] Patient family has pager # [phone]  Family in: [phone].    Prep by: Daniel Oates and ALESSANDRA

## 2022-01-10 ENCOUNTER — TELEPHONE (OUTPATIENT)
Dept: CARDIOLOGY CLINIC | Age: 52
End: 2022-01-10

## 2022-01-10 NOTE — TELEPHONE ENCOUNTER
Per Dr. Sylvain Dickinson had use flecainide before   She may stop for a week and see if it is related to headache\"    Verified patient with two types of identifiers. Notified of MD recommendations. Patient verbalized understanding and will call with any other questions.

## 2022-01-10 NOTE — LETTER
NOTIFICATION RETURN TO WORK     1/10/2022 9:30 AM    Ms. Shannan Philip  [address] [place] [phone]      To Whom It May Concern:    Shannan Philip is currently under the care of [address] [place], [place] [address]e N. She will return to work with left arm restrictions on: 1/10/22    Left arm restrictions to include no heavy lifting or exercises with the left arm. Do no raise arm above the shoulder level. Do not use the affected arm to pull up or push up from a seated or laying down position. Do not allow anyone else to pull on the affected arm. Left arm restrictions are in place for 4 weeks post procedure.        Sincerely,      Vincent Rae MD

## 2022-01-10 NOTE — TELEPHONE ENCOUNTER
Verified patient with two types of identifiers. Notified patient will fax note stating can return to work but left arm restrictions 4 weeks post implant. Patient reports feeling better since device implant; however, has been getting pretty severe headaches. Unsure if from the Flecainide but notes the headache seems to get better as she is nearing the time for her second dose. Patient knows she needs the Flecainide but wanted to know if this was common with medication.  Will update MD/NP for possible recommendations

## 2022-01-10 NOTE — TELEPHONE ENCOUNTER
Pts employer needs a doctors note to return to work today 1/10/2022. Pt is also having headaches which she thinks may be linked a med she has been on and would like to discuss further with the nurse.      Call back: [phone]

## 2022-01-14 ENCOUNTER — DOCUMENTATION ONLY (OUTPATIENT)
Dept: CARDIOLOGY CLINIC | Age: 52
End: 2022-01-14

## 2022-01-14 ENCOUNTER — TELEPHONE (OUTPATIENT)
Dept: CARDIOLOGY CLINIC | Age: 52
End: 2022-01-14

## 2022-01-14 ENCOUNTER — CLINICAL SUPPORT (OUTPATIENT)
Dept: CARDIOLOGY CLINIC | Age: 52
End: 2022-01-14
Payer: COMMERCIAL

## 2022-01-14 ENCOUNTER — CLINICAL SUPPORT (OUTPATIENT)
Dept: CARDIOLOGY CLINIC | Age: 52
End: 2022-01-14

## 2022-01-14 DIAGNOSIS — R00.1 BRADYCARDIA: Primary | ICD-10-CM

## 2022-01-14 DIAGNOSIS — Z95.0 CARDIAC PACEMAKER IN SITU: Primary | ICD-10-CM

## 2022-01-14 DIAGNOSIS — Z95.0 CARDIAC PACEMAKER IN SITU: ICD-10-CM

## 2022-01-14 PROCEDURE — 93280 PM DEVICE PROGR EVAL DUAL: CPT | Performed by: INTERNAL MEDICINE

## 2022-01-14 NOTE — TELEPHONE ENCOUNTER
----- Message from Bard Crabtree MD sent at 1/14/2022 12:59 PM EST -----  May increase flecainide to 100 mg bid  May not lift more than 50 lbs  ----- Message -----  From: Carolina Rhodes RN  Sent: 1/14/2022  11:59 AM EST  To: Brad Crabtree MD    Hey,     (1) she is still feeling palpitations - no A fib on device check today so assuming PACs. Still having headaches but fearful to stop Flecainide. Is there an alternative to the Flecainide that may suppress PACs further?    (2) She lifts weight on a regular basis. After the 6 week post implant period is there a weight limit for bench press she should avoid?

## 2022-01-14 NOTE — PROGRESS NOTES
Cardiac Electrophysiology Wound Check Note      Wound Check   Patient is here for wound check s/p dual chamber PPM.   No fever, drainage   Physical Exam   Constitutional: well-developed and well-nourished. Skin: Left side pocket is healing without redness, drainage, hematoma. The wound is intact. ASSESSMENT and PLAN   The incision is healing without redness, drainage, hematoma. Intact with skin glue. The patient has been compliant with arm restrictions. They will continue arms restrictions for 3 more weeks.   current treatment plan is effective, no change in therapy   Device check shows proper lead and generator functions    Follow-up Disposition:  Return 3 months I will check via device clinic or remote monitoring in the future

## 2022-01-26 ENCOUNTER — TELEPHONE (OUTPATIENT)
Dept: CARDIOLOGY CLINIC | Age: 52
End: 2022-01-26

## 2022-01-26 NOTE — TELEPHONE ENCOUNTER
----- Message from Carlo Lau LPN sent at 3/16/2190 11:10 AM EST -----  Regarding: FW: couple questions    ----- Message -----  From: Leslee Arce  Sent: 1/26/2022  10:52 AM EST  To: Artur Nogueira  Subject: couple questions                                 I have a couple questions about some things I'm feeling and to discuss the meds/headaches if Jacob Mcknight could give me a call please.

## 2022-01-26 NOTE — TELEPHONE ENCOUNTER
Verified patient with two types of identifiers. Patient states that every time she gets into a moving vehicle (car, golf cart, plane) her heart rate increases and she gets SOB. She has had to pull over a couple of times during these episodes. Patient has flown multiple times so not due to anxiety. Patient said she sent a transmission over the weekend. Will ask device clinic for remote findings. Also discussed that if no episodes were noted may need another external monitor to assess for anything the device would not . Patient also still having bad headaches with the Flecainide.

## 2022-01-27 RX ORDER — PROPAFENONE HYDROCHLORIDE 150 MG/1
150 TABLET, FILM COATED ORAL 3 TIMES DAILY
Qty: 90 TABLET | Refills: 5 | Status: SHIPPED
Start: 2022-01-27 | End: 2022-03-22

## 2022-01-27 NOTE — TELEPHONE ENCOUNTER
Problem: Safety  Goal: Will remain free from injury  Outcome: PROGRESSING AS EXPECTED     Problem: Pain Management  Goal: Pain level will decrease to patient's comfort goal  Outcome: PROGRESSING AS EXPECTED     Problem: Respiratory:  Goal: Respiratory status will improve  Outcome: PROGRESSING SLOWER THAN EXPECTED      Per Gloria Guevara \"No events recorded on her device since 1-14-22. Histograms show Average HR in the 70's bpm with Max HR of about 140 bpm. ( Device start to monitor and record above 150 bpm) Normal device functions. \"    VO per Dr. Azam Muhammad patient to come into off to have rate response turned off. Patient to stop Flecainide and start Propafenone 150 mg TID. Verified patient with two types of identifiers. Notified patient of MD recommendations. Scheduled patient to come into the office tomorrow to have rate response turned off. Verified [address] [facility] for Propafenone. Patient verbalized understanding and will call with any other questions.       Future Appointments   Date Time Provider Lamar Garcia   1/28/2022  9:40 AM Marek Adame BS AMB   3/7/2022 11:20 AM MD JOANIE Garcia BS AMB   4/28/2022  2:00 PM PACEMAKER3, HARPREET NAIR BS AMB   4/28/2022  2:20 PM MD JOANIE Chiu BS AMB

## 2022-01-28 ENCOUNTER — CLINICAL SUPPORT (OUTPATIENT)
Dept: CARDIOLOGY CLINIC | Age: 52
End: 2022-01-28

## 2022-01-28 DIAGNOSIS — Z95.0 CARDIAC PACEMAKER IN SITU: Primary | ICD-10-CM

## 2022-03-18 PROBLEM — I45.5 SINUS PAUSE: Status: ACTIVE | Noted: 2022-01-07

## 2022-03-18 PROBLEM — I95.0 IDIOPATHIC HYPOTENSION: Status: ACTIVE | Noted: 2022-01-07

## 2022-03-18 PROBLEM — I49.5 SICK SINUS SYNDROME (HCC): Status: ACTIVE | Noted: 2022-01-07

## 2022-03-19 PROBLEM — Z95.0 PACEMAKER: Status: ACTIVE | Noted: 2022-01-07

## 2022-03-20 PROBLEM — I49.1 PREMATURE ATRIAL CONTRACTIONS: Status: ACTIVE | Noted: 2022-01-07

## 2022-03-22 ENCOUNTER — OFFICE VISIT (OUTPATIENT)
Dept: CARDIOLOGY CLINIC | Age: 52
End: 2022-03-22

## 2022-03-22 ENCOUNTER — CLINICAL SUPPORT (OUTPATIENT)
Dept: CARDIOLOGY CLINIC | Age: 52
End: 2022-03-22

## 2022-03-22 VITALS
HEIGHT: 61 IN | DIASTOLIC BLOOD PRESSURE: 68 MMHG | HEART RATE: 62 BPM | BODY MASS INDEX: 25.7 KG/M2 | SYSTOLIC BLOOD PRESSURE: 96 MMHG

## 2022-03-22 DIAGNOSIS — I49.5 SICK SINUS SYNDROME (HCC): ICD-10-CM

## 2022-03-22 DIAGNOSIS — I47.1 PSVT (PAROXYSMAL SUPRAVENTRICULAR TACHYCARDIA) (HCC): ICD-10-CM

## 2022-03-22 DIAGNOSIS — R53.83 FATIGUE, UNSPECIFIED TYPE: ICD-10-CM

## 2022-03-22 DIAGNOSIS — Z95.0 CARDIAC PACEMAKER IN SITU: ICD-10-CM

## 2022-03-22 DIAGNOSIS — I49.1 PAC (PREMATURE ATRIAL CONTRACTION): ICD-10-CM

## 2022-03-22 DIAGNOSIS — R42 DIZZINESS: ICD-10-CM

## 2022-03-22 DIAGNOSIS — R00.2 PALPITATIONS: Primary | ICD-10-CM

## 2022-03-22 DIAGNOSIS — R06.09 DYSPNEA ON EXERTION: ICD-10-CM

## 2022-03-22 DIAGNOSIS — Z95.0 CARDIAC PACEMAKER IN SITU: Primary | ICD-10-CM

## 2022-03-22 PROCEDURE — 93000 ELECTROCARDIOGRAM COMPLETE: CPT | Performed by: NURSE PRACTITIONER

## 2022-03-22 PROCEDURE — 99024 POSTOP FOLLOW-UP VISIT: CPT | Performed by: NURSE PRACTITIONER

## 2022-03-22 PROCEDURE — 93227 XTRNL ECG REC<48 HR R&I: CPT | Performed by: INTERNAL MEDICINE

## 2022-03-22 PROCEDURE — 93225 XTRNL ECG REC<48 HRS REC: CPT | Performed by: INTERNAL MEDICINE

## 2022-03-22 RX ORDER — DIGOXIN 125 MCG
0.12 TABLET ORAL DAILY
Qty: 30 TABLET | Refills: 5 | Status: SHIPPED | OUTPATIENT
Start: 2022-03-22 | End: 2022-03-29 | Stop reason: ALTCHOICE

## 2022-03-22 NOTE — PROGRESS NOTES
Cardiac Electrophysiology OFFICE Note     Subjective:      Dany Cartagena is a 54 y.o. patient who is seen for management of symptomatic AF. She is s/p Medtronic dual chamber pacemaker (DOI 01/07/2022). Also with known PACs & PSVT. She called this morning to report palpitations, SOB, & lightheadedness. States she's felt sluggish ever since starting propafenone 2 months ago, has frequent headaches as well. Up until a couple weeks ago, however, was able to go 5 miles on a treadmill without difficulty. Slowly declining activity tolerance since then. Now with palpitations & SOB with any exertion over the past 2 days. OK if she's sitting still. Device check today shows intermittent brief PSVT (1:1 conduction to 150s). ECG today shows AP-VS rhythm with narrow QRS. Stress echo in 06/2021 reported negative for ischemia by Dr. Airam Merrill.    BP low normal.    [id number] 1 (female), on no OAC. Previous:  S/p Medtronic dual chamber pacemaker (DOI 01/07/2022). Rate response deactivated due to sensitivity to rapid rate. External monitor in 01/2022 showed 5.5 second sinus pause, subsequent SB 20 bpm.    Failed flecainide. Unable to tolerate diltiazem ER & Toprol XL.  HR & BP both trend low. She had headache with it. History of DVT post knee surgery. Palpitations, likely PVCs after 3rd pregnancy. Vapes. Works in [employer] with Dr Airam Merrill and Barrett Howard.       Problem List  Date Reviewed: 1/7/2022          Codes Class Noted    Sick sinus syndrome [facility]) ICD-10-CM: I49.5  ICD-9-CM: 427.81  1/7/2022        Sinus pause ICD-10-CM: I45.5  ICD-9-CM: 426.6  1/7/2022        Pacemaker ICD-10-CM: Z95.0  ICD-9-CM: V45.01  1/7/2022    Overview Signed 1/7/2022  1:35 PM by Kathleen Montgomery MD     1/7/2022 Medtronic dual chamber             Premature atrial contractions ICD-10-CM: I49.1  ICD-9-CM: 427.61  1/7/2022        Idiopathic hypotension ICD-10-CM: I95.0  ICD-9-CM: 458.9  1/7/2022    Overview Signed 1/7/2022  3:21 PM by Nishant [place] MD     Cannot tolerate metoprolol                   Current Outpatient Medications   Medication Sig Dispense Refill    digoxin (LANOXIN) 0.125 mg tablet Take 1 Tablet by mouth daily. 30 Tablet 5    acyclovir (ZOVIRAX) 400 mg tablet Take 400 mg by mouth two (2) times a day.  OTHER 3 Drops by Nasal route two (2) times a day. 3 drops-Estradiol 25 mg/30 mL      multivitamin (ONE A DAY) tablet Take 1 Tab by mouth daily. Allergies   Allergen Reactions    Codeine Rash     Past Medical History:   Diagnosis Date    IBS (irritable bowel syndrome)     Thromboembolus (HCC)      Past Surgical History:   Procedure Laterality Date    HX HYSTERECTOMY      HX ORTHOPAEDIC      knee and foot surgery     No family history on file. Social History     Tobacco Use    Smoking status: Light Tobacco Smoker     Packs/day: 0.50     Types: Cigarettes, Pipe    Smokeless tobacco: Never Used    Tobacco comment: Livia Winkler 2018 (occasionally vape 1-3 times a month)   Substance Use Topics    Alcohol use: Yes     Comment: 2 a month        Review of Systems: Review of all other systems otherwise negative. Constitutional: Negative for fever, chills, weight loss, + malaise/fatigue. HEENT: Negative for nosebleeds, vision changes. Respiratory: Negative for cough, hemoptysis. Cardiovascular: Negative for chest pain, + palpitations, no orthopnea, claudication, leg swelling, syncope, and PND. + DE LEON  Gastrointestinal: Negative for nausea, vomiting, diarrhea, blood in stool and melena. Genitourinary: Negative for dysuria, and hematuria. Musculoskeletal: Negative for myalgias, arthralgia. Skin: Negative for rash. Heme: Does not bleed or bruise easily. Neurological: Negative for speech change and focal weakness. Objective:     Visit Vitals  BP 96/68   Pulse 62   Ht 5' 1\" (1.549 m)   BMI 25.70 kg/m²      Physical Exam:   Constitutional: Well-developed and well-nourished.  No respiratory distress. Head: Normocephalic and atraumatic. Eyes: Pupils are equal, round. ENT: Hearing grossly normal.  Neck: Supple. No JVD present. Cardiovascular: Normal rate, regular rhythm. Exam reveals no gallop and no friction rub. No murmur heard. Pulmonary/Chest: Effort normal and breath sounds normal. No wheezes. Abdominal: Soft, no tenderness. Musculoskeletal: Moves extremities independently. Normal gait. Vasc/lymphatic: No edema. Neurological: Alert,oriented. Skin: Skin is warm and dry. Left chest pacer site well healed. Psychiatric: Normal mood and affect. Behavior is normal. Judgment and thought content normal.      EKG: AP-VS, narrow QRS. Assessment/Plan:     Imaging/Studies:  Loop monitor (01/2022): Sinus pause x 5.5 seconds with 1st degree AVB. Stress echo (06/10/2021): No sign of ischemia reported. ICD-10-CM ICD-9-CM    1. Palpitations  R00.2 785.1    2. Sick sinus syndrome (HCC)  I49.5 427.81 AMB POC EKG ROUTINE W/ 12 LEADS, INTER & REP      CARDIAC HOLTER MONITOR      ECHO ADULT COMPLETE      TSH 3RD GENERATION      T3, FREE      T4, FREE      TSH 3RD GENERATION      T3, FREE      T4, FREE   3. Dizziness  R42 780.4 AMB POC EKG ROUTINE W/ 12 LEADS, INTER & REP      CARDIAC HOLTER MONITOR      ECHO ADULT COMPLETE      TSH 3RD GENERATION      T3, FREE      T4, FREE      TSH 3RD GENERATION      T3, FREE      T4, FREE   4. Fatigue, unspecified type  R53.83 780.79 AMB POC EKG ROUTINE W/ 12 LEADS, INTER & REP      CARDIAC HOLTER MONITOR      ECHO ADULT COMPLETE      TSH 3RD GENERATION      T3, FREE      T4, FREE      TSH 3RD GENERATION      T3, FREE      T4, FREE   5. Cardiac pacemaker in situ  Z95.0 V45.01    6. PAC (premature atrial contraction)  I49.1 427.61    7. PSVT (paroxysmal supraventricular tachycardia) (HCC)  I47.1 427.0    8.  Dyspnea on exertion  R06.00 786.09        Medtronic dual chamber pacemaker (DOI 01/07/2022): Device check today shows proper lead & generator function. Generator longevity adequate. Mainly atrial paced. Intermittent runs of PSVT with rates 150s, but not consistent over the past week. Longest lasts for minutes at a time. Palpitations: Symptomatic with palpitations, lightheadedness, & SOB.  ECG today shows AP-VS rhythm with narrow QRS. She has felt poorly on propafenone as well, has frequent headaches & low energy. Will stop propafenone, start digoxin 0.125 mg po daily. She will keep propafenone on hand in case palpitations worsen. Check 48 hr holter. Will also check thyroid panel. DE LEON: Check 2D echo. LVEF was WNL on stress echo in 06/2021. PACs/PSVT: Noted on prior monitor & brief PSVT on device check today. Follow up with Dr. Margy Denise as noted below. Future Appointments   Date Time Provider Lamar Garcia   3/24/2022  8:00 AM ECHOTMARCI PRADO BS AMB   3/31/2022 12:00 PM MD JOANIE Hdez BS AMB   4/28/2022  2:00 PM [email or web address] BS AMB   4/28/2022  2:20 PM MD JOANIE Hdez BS AMB     Thank you for involving me in this patient's care and please call with further concerns or questions.     Lazarus Nanas, FNP-C [facility]  03/22/22

## 2022-03-23 LAB
T3FREE SERPL-MCNC: 2.8 PG/ML (ref 2–4.4)
T4 FREE SERPL-MCNC: 1.16 NG/DL (ref 0.82–1.77)
TSH SERPL DL<=0.005 MIU/L-ACNC: 2.26 UIU/ML (ref 0.45–4.5)

## 2022-03-24 ENCOUNTER — PATIENT MESSAGE (OUTPATIENT)
Dept: CARDIOLOGY CLINIC | Age: 52
End: 2022-03-24

## 2022-03-24 ENCOUNTER — TELEPHONE (OUTPATIENT)
Dept: CARDIOLOGY CLINIC | Age: 52
End: 2022-03-24

## 2022-03-24 ENCOUNTER — ANCILLARY PROCEDURE (OUTPATIENT)
Dept: CARDIOLOGY CLINIC | Age: 52
End: 2022-03-24
Payer: COMMERCIAL

## 2022-03-24 VITALS
DIASTOLIC BLOOD PRESSURE: 64 MMHG | WEIGHT: 136 LBS | HEIGHT: 61 IN | SYSTOLIC BLOOD PRESSURE: 104 MMHG | BODY MASS INDEX: 25.68 KG/M2

## 2022-03-24 DIAGNOSIS — R53.83 FATIGUE, UNSPECIFIED TYPE: ICD-10-CM

## 2022-03-24 DIAGNOSIS — R42 DIZZINESS: ICD-10-CM

## 2022-03-24 DIAGNOSIS — I49.5 SICK SINUS SYNDROME (HCC): ICD-10-CM

## 2022-03-24 PROCEDURE — 93306 TTE W/DOPPLER COMPLETE: CPT | Performed by: INTERNAL MEDICINE

## 2022-03-26 LAB
ECHO AO ASC DIAM: 3.1 CM
ECHO AO ASCENDING AORTA INDEX: 1.94 CM/M2
ECHO AO ROOT DIAM: 3.3 CM
ECHO AO ROOT INDEX: 2.06 CM/M2
ECHO AV AREA PEAK VELOCITY: 3.6 CM2
ECHO AV AREA VTI: 3.8 CM2
ECHO AV AREA/BSA PEAK VELOCITY: 2.3 CM2/M2
ECHO AV AREA/BSA VTI: 2.4 CM2/M2
ECHO AV MEAN GRADIENT: 2 MMHG
ECHO AV MEAN VELOCITY: 0.7 M/S
ECHO AV PEAK GRADIENT: 4 MMHG
ECHO AV PEAK VELOCITY: 1 M/S
ECHO AV VELOCITY RATIO: 0.9
ECHO AV VTI: 20.7 CM
ECHO EST RA PRESSURE: 3 MMHG
ECHO LA DIAMETER INDEX: 2.13 CM/M2
ECHO LA DIAMETER: 3.4 CM
ECHO LA TO AORTIC ROOT RATIO: 1.03
ECHO LA VOL 2C: 36 ML (ref 22–52)
ECHO LA VOL 4C: 53 ML (ref 22–52)
ECHO LA VOLUME AREA LENGTH: 46 ML
ECHO LA VOLUME INDEX A2C: 23 ML/M2 (ref 16–34)
ECHO LA VOLUME INDEX A4C: 33 ML/M2 (ref 16–34)
ECHO LA VOLUME INDEX AREA LENGTH: 29 ML/M2 (ref 16–34)
ECHO LV E' LATERAL VELOCITY: 11 CM/S
ECHO LV E' SEPTAL VELOCITY: 11 CM/S
ECHO LV EDV A2C: 111 ML
ECHO LV EDV A4C: 108 ML
ECHO LV EDV BP: 111 ML (ref 56–104)
ECHO LV EDV INDEX A4C: 68 ML/M2
ECHO LV EDV INDEX BP: 69 ML/M2
ECHO LV EDV NDEX A2C: 69 ML/M2
ECHO LV EJECTION FRACTION A2C: 55 %
ECHO LV EJECTION FRACTION A4C: 64 %
ECHO LV EJECTION FRACTION BIPLANE: 59 % (ref 55–100)
ECHO LV ESV A2C: 49 ML
ECHO LV ESV A4C: 39 ML
ECHO LV ESV BP: 45 ML (ref 19–49)
ECHO LV ESV INDEX A2C: 31 ML/M2
ECHO LV ESV INDEX A4C: 24 ML/M2
ECHO LV ESV INDEX BP: 28 ML/M2
ECHO LV FRACTIONAL SHORTENING: 33 % (ref 28–44)
ECHO LV GLOBAL LONGITUDINAL STRAIN (GLS): -21.2 %
ECHO LV INTERNAL DIMENSION DIASTOLE INDEX: 3 CM/M2
ECHO LV INTERNAL DIMENSION DIASTOLIC: 4.8 CM (ref 3.9–5.3)
ECHO LV INTERNAL DIMENSION SYSTOLIC INDEX: 2 CM/M2
ECHO LV INTERNAL DIMENSION SYSTOLIC: 3.2 CM
ECHO LV IVSD: 0.7 CM (ref 0.6–0.9)
ECHO LV MASS 2D: 116.6 G (ref 67–162)
ECHO LV MASS INDEX 2D: 72.9 G/M2 (ref 43–95)
ECHO LV POSTERIOR WALL DIASTOLIC: 0.8 CM (ref 0.6–0.9)
ECHO LV RELATIVE WALL THICKNESS RATIO: 0.33
ECHO LVOT AREA: 3.8 CM2
ECHO LVOT AV VTI INDEX: 0.97
ECHO LVOT DIAM: 2.2 CM
ECHO LVOT MEAN GRADIENT: 2 MMHG
ECHO LVOT PEAK GRADIENT: 4 MMHG
ECHO LVOT PEAK VELOCITY: 0.9 M/S
ECHO LVOT STROKE VOLUME INDEX: 47.7 ML/M2
ECHO LVOT SV: 76.4 ML
ECHO LVOT VTI: 20.1 CM
ECHO MV A VELOCITY: 0.56 M/S
ECHO MV AREA PHT: 4.5 CM2
ECHO MV AREA VTI: 3.7 CM2
ECHO MV E DECELERATION TIME (DT): 170.2 MS
ECHO MV E VELOCITY: 0.77 M/S
ECHO MV E/A RATIO: 1.38
ECHO MV E/E' LATERAL: 7
ECHO MV E/E' RATIO (AVERAGED): 7
ECHO MV E/E' SEPTAL: 7
ECHO MV LVOT VTI INDEX: 1.04
ECHO MV MAX VELOCITY: 0.7 M/S
ECHO MV MEAN GRADIENT: 1 MMHG
ECHO MV MEAN VELOCITY: 0.4 M/S
ECHO MV PEAK GRADIENT: 2 MMHG
ECHO MV PRESSURE HALF TIME (PHT): 49.4 MS
ECHO MV VTI: 20.9 CM
ECHO RV FREE WALL PEAK S': 11 CM/S
ECHO RV INTERNAL DIMENSION: 4 CM
ECHO RV TAPSE: 2 CM (ref 1.5–2)

## 2022-03-28 NOTE — TELEPHONE ENCOUNTER
----- Message from Vincent Rae MD sent at 3/26/2022  3:46 PM EDT -----  Echo normal, trace MR TR    Future Appointments  3/31/2022  12:00 PM   MD JOANIE Tinsley              BS AMB  4/28/2022  2:00 PM    PACEMAKER3, HARPREET NAIR              BS AMB  4/28/2022  2:20 PM    MD JOANIE Tinsley              BS AMB

## 2022-03-28 NOTE — TELEPHONE ENCOUNTER
Printed off 48 hour holter. Moved appointment to tomorrow at 11:40 am.    Verified patient with two types of identifiers. Patient agreed to appointment change. Patient verbalized understanding and will call with any other questions.       Future Appointments   Date Time Provider Lamar Garcia   3/29/2022 11:40 AM MD JOANIE Tinsley BS AMB   4/28/2022  2:00 PM PACEMAKER3, HARPREET FRANKS AMB   4/28/2022  2:20 PM MD JOANIE Tinsley BS AMB

## 2022-03-29 ENCOUNTER — PATIENT MESSAGE (OUTPATIENT)
Dept: CARDIOLOGY CLINIC | Age: 52
End: 2022-03-29

## 2022-03-29 ENCOUNTER — OFFICE VISIT (OUTPATIENT)
Dept: CARDIOLOGY CLINIC | Age: 52
End: 2022-03-29
Payer: COMMERCIAL

## 2022-03-29 VITALS
HEART RATE: 54 BPM | WEIGHT: 144.4 LBS | RESPIRATION RATE: 18 BRPM | BODY MASS INDEX: 27.26 KG/M2 | DIASTOLIC BLOOD PRESSURE: 72 MMHG | HEIGHT: 61 IN | SYSTOLIC BLOOD PRESSURE: 118 MMHG | OXYGEN SATURATION: 100 %

## 2022-03-29 DIAGNOSIS — I47.1 PSVT (PAROXYSMAL SUPRAVENTRICULAR TACHYCARDIA) (HCC): ICD-10-CM

## 2022-03-29 DIAGNOSIS — I49.1 PAC (PREMATURE ATRIAL CONTRACTION): ICD-10-CM

## 2022-03-29 DIAGNOSIS — R42 DIZZINESS: ICD-10-CM

## 2022-03-29 DIAGNOSIS — I49.5 SICK SINUS SYNDROME (HCC): ICD-10-CM

## 2022-03-29 DIAGNOSIS — R00.2 PALPITATIONS: ICD-10-CM

## 2022-03-29 DIAGNOSIS — Z95.0 CARDIAC PACEMAKER IN SITU: Primary | ICD-10-CM

## 2022-03-29 PROCEDURE — 99214 OFFICE O/P EST MOD 30 MIN: CPT | Performed by: INTERNAL MEDICINE

## 2022-03-29 NOTE — PATIENT INSTRUCTIONS
A referral has been sent for you to see [facility]. They will be in contact with you to scheduled an appointment. If you do not receive a call by next week please contact them at [phone]. A referral has been sent for you to see ENT, Dr. Natasha Aguilar. They will be in contact with you to scheduled an appointment. If you do not receive a call by next week please contact them at [phone].

## 2022-03-29 NOTE — PROGRESS NOTES
Cardiac Electrophysiology OFFICE Note     Subjective:       Parisa Duke is a 54 y.o. female patient who is seen for evaluation/management of PACs & PSVT. Eleazar Mcclelland was kindly referred by Dr. Carlito Maria.     She notes DE LEON and at rest with dizziness.    In the past she states this began after her COVID vaccine    Flecainide 50 mg twice a day was initially effective. Then dose was increased to 100 mg twice a day to control her symptoms more. The patient uses [employer] and said it had reported that she has paroxysmal atrial fibrillation    Holter in the past showed 3% PACs, few PVC.  Stress echo was reported negative for ischemia by Dr Carlito Maria. Unable to tolerate diltiazem ER & Toprol XL.  HR & BP both trend low. She had headache with it  Then she had sick sinus syndrome and got dual chamber pacer    Now on digoxin and does not think it is effective    Prior DVT, but no longer on anticoagulant. Previous:   History of DVT post knee surgery. Palpitations, likely PVCs after 3rd pregnancy. Vapes. Works in [employer] with Dr Carlito Maria and Jose Fernandez       Current Outpatient Medications   Medication Sig    acyclovir (ZOVIRAX) 400 mg tablet Take 400 mg by mouth two (2) times a day.  OTHER 3 Drops by Nasal route two (2) times a day. 3 drops-Estradiol 25 mg/30 mL    multivitamin (ONE A DAY) tablet Take 1 Tab by mouth daily. No current facility-administered medications for this visit. Allergies   Allergen Reactions   · Codeine Rash     Past Medical History:   Diagnosis Date   · IBS (irritable bowel syndrome)   · Thromboembolus (HCC)     Past Surgical History:   Procedure Laterality Date   · HX HYSTERECTOMY   · HX ORTHOPAEDIC   knee and foot surgery     No family history on file.    Social History     Tobacco Use   · Smoking status: Light Tobacco Smoker   Packs/day: 0.50   Types: Cigarettes, Pipe   · Smokeless tobacco: Never Used   · Tobacco comment: QuitCig 2018 (occasionally vape 1-3 times a month)   Substance Use Topics   · Alcohol use: Yes   Comment: 2 a month         Review of Systems: Review of all other systems otherwise negative. Constitutional: Negative for fever, chills, weight loss, + malaise/fatigue. HEENT: Negative for nosebleeds, vision changes. Respiratory: Negative for cough, hemoptysis   Cardiovascular: Negative for chest pain, + dizziness,  palpitations, no orthopnea, claudication, leg swelling, syncope, and PND. + DE LEON   Gastrointestinal: Negative for nausea, vomiting, diarrhea, blood in stool and melena. Genitourinary: Negative for dysuria, and hematuria. Musculoskeletal: Negative for myalgias, arthralgia. Skin: Negative for rash. Heme: Does not bleed or bruise easily. Neurological: Negative for speech change and focal weakness.       Objective:     Visit Vitals  /72 (BP 1 Location: Left arm, BP Patient Position: Sitting, BP Cuff Size: Adult)   Pulse (!) 54   Resp 18   Ht 5' 1\" (1.549 m)   Wt 144 lb 6.4 oz (65.5 kg)   SpO2 100%   BMI 27.28 kg/m²         Physical Exam:   Constitutional: Well-developed and well-nourished. No respiratory distress. Head: Normocephalic and atraumatic. Eyes: Pupils are equal, round. ENT: Hearing grossly normal.   Neck: Supple. No JVD present. Cardiovascular: regular rhythm. Exam reveals no gallop and no friction rub. No murmur heard. Pulmonary/Chest: Effort normal and breath sounds normal. No wheezes. Abdominal: Soft, no tenderness. Musculoskeletal: Moves extremities independently.  Normal gait. Vasc/lymphatic: No edema. Neurological: Alert,oriented. Skin: Skin is warm and dry. Psychiatric: Normal mood and affect. Behavior is normal. Judgment and thought content normal.       EKG (02/26/2021): Sinus rhythm with sinus arrhythmia. Assessment/Plan:     Imaging/Studies:   Stress echo (06/10/2021): Normal.     Holter (05/2021): HR  bpm, avg 59 bpm.  PACs about 2800 or 3.37%, 2 PVCs.           ICD-10-CM ICD-9-CM    1.  Cardiac pacemaker in situ  Z95.0 V45.01    2. Sick sinus syndrome (HCC)  I49.5 427.81    3. Dizziness  R42 780.4    4. Palpitations  R00.2 785.1    5. PSVT (paroxysmal supraventricular tachycardia) (HCC)  I47.1 427.0    6. PAC (premature atrial contraction)  I49.1 427.61      Pacemaker had been done and thought to help but now she did not think so  Cannot tolerate medications so has stopped digoxin by me and previously flecainide  Could not tolerate beta blocker or calcium channel blocker  I am not sure there are cardiac reason for her symptoms  Echo was normal  Stress echo had been normal  PACs/PAT Symptomatic with palpitations,   She said she has used [employer] and it said she had paroxysmal atrial fibrillation  But this is not confirmed    holter 3/22/2022    68 average, sinus tach  478 PAC (0.29%)   (0.06%)      I have explained to her I will refer her to Dr Brinda Seals for dizziness  She had seen Dr Keegan Baig MRI was negative  She will also get PFT  Not done cardiac MRI yet but echo was normal           Future Appointments   Date Time Provider Lamar Garcia   4/28/2022  2:00 PM PACEMAKER3, HARPREET FRANKS AMB   4/28/2022  2:20 PM MD JOANIE Mcclellan BS AMB         Thank you for involving me in this patient's care and please call with further concerns or questions. Aniyah Amado M.D.    Electrophysiology/Cardiology   [facility]   [address] [id number] [facility], [address] [place] [address]   [phone]                                        [phone]

## 2022-03-31 ENCOUNTER — DOCUMENTATION ONLY (OUTPATIENT)
Dept: CARDIOLOGY CLINIC | Age: 52
End: 2022-03-31

## 2022-03-31 NOTE — PROGRESS NOTES
Will pull pacer remote and see her rate histogram, sensor ok?   Dizziness and dyspnea  If flat, we need rate 70 bpm and change her sensor setting

## 2022-04-11 ENCOUNTER — TELEPHONE (OUTPATIENT)
Dept: CARDIOLOGY CLINIC | Age: 52
End: 2022-04-11

## 2022-04-11 NOTE — TELEPHONE ENCOUNTER
Faxed short term disability paper work to [employer] at fax number [phone]. Fax confirmation received.

## 2022-04-13 ENCOUNTER — DOCUMENTATION ONLY (OUTPATIENT)
Dept: CARDIOLOGY CLINIC | Age: 52
End: 2022-04-13

## 2022-05-06 ENCOUNTER — PATIENT MESSAGE (OUTPATIENT)
Dept: CARDIOLOGY CLINIC | Age: 52
End: 2022-05-06

## 2022-05-09 ENCOUNTER — TELEPHONE (OUTPATIENT)
Dept: CARDIOLOGY CLINIC | Age: 52
End: 2022-05-09

## 2022-05-09 NOTE — TELEPHONE ENCOUNTER
----- Message from Zainab Mracelo NP sent at 5/9/2022 12:14 PM EDT -----  Regarding: FW: Referral  That's fine. ----- Message -----  From: Natalie Mejia RN  Sent: 5/9/2022  11:43 AM EDT  To: Zachary Campo MD, Zainab Marcelo NP  Subject: FW: Referral                                     Are you okay with me sending letter?  ----- Message -----  From: Rafat Seals  Sent: 5/9/2022  11:42 AM EDT  To: He Nogueira  Subject: Referral                                         Estefany,   I am so sorry to bother you with this, again. I don't think I'll ever use Short Term Disability again, what a hassle. .. I went back to work today not knowing I couldn't just go back to work without the doctor releasing me to go back to work. I explained to them that Dr Laura Osborn hasn't seen me since the last clinic notes they received and they said they don't need office notes but the doctor that took me out of work has to return me to work. There is no \"form\" that needs to be completed, [employer] just needs a simple letter, on letterhead, saying I can return to work with no restrictions effective 5/9/22. Can you help with this please? It can be faxed to [phone].   Thanks as always,   Anita Velez

## 2022-05-09 NOTE — LETTER
NOTIFICATION RETURN TO WORK    5/9/2022 1:36 PM    Ms. Jace Aquino  [address] [place] [id number]      To Whom It May Concern:    Jace Aquino is currently under the care of [address] [place] [address]. She will return to work with no restrictions on: 5/9/22    If there are questions or concerns please have the patient contact our office.         Sincerely,      Marlee Roberts MD

## 2022-06-23 NOTE — TELEPHONE ENCOUNTER
----- Message from Sanjuanita Johns NP sent at 3/23/2022  7:54 PM EDT -----  Thyroid panel WNL. No change in treatment/evaluation plan based on results. ______________________________________________________    To promote your recovery your physician has ordered home care services through   [facility]. A caregiver will be contacting you after your discharge to schedule your first appointment. If you have any questions, please contact [facility] at: [phone] or [phone].   Thank you for choosing [facility].  ______________________________________________________

## 2022-12-06 ENCOUNTER — OFFICE VISIT (OUTPATIENT)
Dept: OBGYN CLINIC | Age: 52
End: 2022-12-06
Payer: COMMERCIAL

## 2022-12-06 VITALS
DIASTOLIC BLOOD PRESSURE: 85 MMHG | WEIGHT: 145.2 LBS | SYSTOLIC BLOOD PRESSURE: 119 MMHG | HEIGHT: 61 IN | BODY MASS INDEX: 27.41 KG/M2

## 2022-12-06 DIAGNOSIS — R68.82 DECREASED LIBIDO: ICD-10-CM

## 2022-12-06 DIAGNOSIS — Z13.820 OSTEOPOROSIS SCREENING: ICD-10-CM

## 2022-12-06 DIAGNOSIS — Z01.419 ROUTINE GYNECOLOGICAL EXAMINATION: Primary | ICD-10-CM

## 2022-12-06 DIAGNOSIS — B96.89 BV (BACTERIAL VAGINOSIS): ICD-10-CM

## 2022-12-06 DIAGNOSIS — N76.0 BV (BACTERIAL VAGINOSIS): ICD-10-CM

## 2022-12-06 PROBLEM — I47.10 PSVT (PAROXYSMAL SUPRAVENTRICULAR TACHYCARDIA): Status: ACTIVE | Noted: 2022-05-04

## 2022-12-06 PROBLEM — I47.1 PSVT (PAROXYSMAL SUPRAVENTRICULAR TACHYCARDIA) (HCC): Status: ACTIVE | Noted: 2022-05-04

## 2022-12-06 PROBLEM — Z95.0 CARDIAC PACEMAKER IN SITU: Status: ACTIVE | Noted: 2022-01-07

## 2022-12-06 PROCEDURE — 99396 PREV VISIT EST AGE 40-64: CPT | Performed by: OBSTETRICS & GYNECOLOGY

## 2022-12-06 RX ORDER — BUPROPION HYDROCHLORIDE 150 MG/1
TABLET ORAL
COMMUNITY
Start: 2022-10-25

## 2022-12-06 RX ORDER — TRAZODONE HYDROCHLORIDE 50 MG/1
TABLET ORAL
COMMUNITY
Start: 2022-10-24

## 2022-12-06 RX ORDER — ESZOPICLONE 3 MG/1
TABLET, FILM COATED ORAL
COMMUNITY
Start: 2022-11-30

## 2022-12-06 RX ORDER — AMITRIPTYLINE HYDROCHLORIDE 25 MG/1
TABLET, FILM COATED ORAL
COMMUNITY
Start: 2022-11-02

## 2022-12-06 RX ORDER — METRONIDAZOLE 500 MG/1
500 TABLET ORAL 2 TIMES DAILY
Qty: 14 TABLET | Refills: 0 | Status: SHIPPED | OUTPATIENT
Start: 2022-12-06 | End: 2022-12-13

## 2022-12-06 RX ORDER — FLUDROCORTISONE ACETATE 0.1 MG/1
0.1 TABLET ORAL DAILY
COMMUNITY
Start: 2022-10-18

## 2022-12-06 RX ORDER — MIDODRINE HYDROCHLORIDE 2.5 MG/1
2.5 TABLET ORAL DAILY
COMMUNITY
Start: 2022-10-04

## 2022-12-06 RX ORDER — BUTALBITAL, ACETAMINOPHEN AND CAFFEINE 50; 325; 40 MG/1; MG/1; MG/1
TABLET ORAL
COMMUNITY
Start: 2022-08-31

## 2022-12-06 NOTE — PROGRESS NOTES
Parisa Duke is a 50 y.o. female presents for a problem visit. Chief Complaint   Patient presents with    Vaginal Discharge     No LMP recorded. Patient has had a hysterectomy. Birth Control: status post hysterectomy. Last Pap: normal obtained 3/11/2015. The patient is reporting having: Vaginal Discharge for 2  months. Whitish with odor. Having PC bleeding. She reports the symptoms are has worsened. Aggravating factors include none. And alleviating factors include none . 1. Have you been to the ER, urgent care clinic, or hospitalized since your last visit? [facility] ER  1/2021 had reaction to Covid Vaccine. DX with POTS. Had [employer] 1/2022 followed up with [facility]. 2. Have you seen or consulted any other health care providers outside of the [address] [place] [address] since your last visit?  No    Examination chaperoned by Jhonatan Colunga MA.

## 2022-12-06 NOTE — PROGRESS NOTES
Armando Coleman is a 50 y.o. female returns for an annual exam     No chief complaint on file. No LMP recorded. Patient has had a hysterectomy. Her periods are  absent patient has had hysterectomy. Problems: {problem:21965}  Birth Control: status post hysterectomy. Last Pap: {NORMAL_ABNORMAL:89673::\"see report\"} obtained {Numbers; 1-4 :48891376} year(s) ago. .   Last Mammogram: {mammo:21966}. It was {NORMAL_ABNORMAL:29135::\"see report\"}   Last Bone Density: {NORMAL_ABNORMAL:04882::\"see report\"} obtained {Numbers; 1-4 :84058504} year(s) ago. Last colonoscopy: {NORMAL_ABNORMAL:63344::\"see report\"} obtained {Numbers; 1-4 :[id number]} year(s) ago. 1. Have you been to the ER, urgent care clinic, or hospitalized since your last visit? {Yes when where and reason for visit:20441}    2. Have you seen or consulted any other health care providers outside of the [address] [place] [address] since your last visit?  {Yes when where and reason for visit:20441}    Examination chaperoned by Cody Prado MA.

## 2022-12-06 NOTE — PROGRESS NOTES
[address] [facility] 40-64 POST HYSTERECTOMY      Fede Le is a 50y.o. year old  [address] [facility],[address] female   No LMP recorded. Patient has had a hysterectomy. She presents for her annual checkup. Patient has not had exam for 6 years. Usually only gets intermittent checkups due to history of NENO/BSO and never had abnormal pap. Also h/o bilateral mastectomies  Does not get mammograms. Last colonoscopy 10 years ago. Has not had a DEXA scan    C/o vaginal discharge with odor for several months. Doesn't feel clean. Lack of libido as well.       Medical History:  Patient Active Problem List   Diagnosis Code    Sick sinus syndrome ([facility]) I49.5    Sinus pause I45.5    Cardiac pacemaker in situ Z95.0    Premature atrial contractions I49.1    Idiopathic hypotension I95.0    PSVT (paroxysmal supraventricular tachycardia) ([facility]) I47.1     Past Medical History:   Diagnosis Date    Breast cancer ([facility] 2017    Depression     DVT (deep venous thrombosis) ([facility]     Endometriosis     Headache     IBS (irritable bowel syndrome)     Thromboembolus ([facility]      Past Surgical History:   Procedure Laterality Date    HX ABDOMINOPLASTY      HX BREAST AUGMENTATION  2017    How they found breast cancer    HX  SECTION      HX COLONOSCOPY  2012    HX MASTECTOMY Bilateral 2017    HX ORTHOPAEDIC      knee and foot surgery    HX NENO AND BSO      endometriosis ectopic     OB History    Para Term  AB Living   4 3 3 0 1 3   SAB IAB Ectopic Molar Multiple Live Births   1 0 0 0 0 3      # Outcome Date GA Lbr Daniel/2nd Weight Sex Delivery Anes PTL Lv   4 Term      CS-LTranv   TAMARA   3 Term      CS-LTranv   TAMARA   2 Term      CS-LTranv   TAMARA   1 SAB      SAB        Social History     Socioeconomic History    Marital status:    Tobacco Use    Smoking status: Former     Packs/day: 0.00     Types: Cigarettes, Pipe     Quit date: 2015     Years since quittin.6    Smokeless tobacco: Never Tobacco comments:     Vape occasionally   Substance and Sexual Activity    Alcohol use: Yes     Comment: Don't drink weekly. Maybe 1-2 a month    Drug use: Never    Sexual activity: Not Currently     Partners: Male     Birth control/protection: None      Family History   Problem Relation Age of Onset    Breast Cancer Maternal Aunt      Current Outpatient Medications on File Prior to Visit   Medication Sig Dispense Refill    amitriptyline (ELAVIL) 25 mg tablet TAKE 1 TABLET BY MOUTH NIGHTLY. WAIT 1 WEEK AFTER STOPPING BUPROPION TO START TAKING THIS MEDICATION      buPROPion XL (WELLBUTRIN XL) 150 mg tablet TAKE 1 TABLET BY MOUTH EVERY DAY IN THE MORNING FOR 90 DAYS      butalbital-acetaminophen-caffeine (FIORICET, ESGIC) -40 mg per tablet TAKE 1 TABLET BY MOUTH THREE TIMES A DAY AS NEEDED      eszopiclone (LUNESTA) 3 mg tablet TAKE 1 TABLET IMMEDIATELY BEFORE BEDTIME ONCE A DAY FOR 30 DAYS      fludrocortisone (FLORINEF) 0.1 mg tablet Take 0.1 mg by mouth daily. midodrine (PROAMATINE) 2.5 mg tablet Take 2.5 mg by mouth daily. traZODone (DESYREL) 50 mg tablet TAKE 1 TABLET BY MOUTH EVERY DAY AT BEDTIME AS NEEDED FOR 30 DAYS      acyclovir (ZOVIRAX) 400 mg tablet Take 400 mg by mouth two (2) times a day. multivitamin (ONE A DAY) tablet Take 1 Tab by mouth daily. No current facility-administered medications on file prior to visit.      Allergies   Allergen Reactions    Codeine Rash and Hives    Adhesive Tape-Silicones Rash    Zinc Itching and Rash       Review of Systems:  History obtained from the patient-negative for:  Constitutional: weight loss, fever, night sweats  HEENT: hearing loss, earache, congestion, snoring, sorethroat  CV: chest pain, palpitations, edema  Resp: cough, shortness of breath, wheezing  Breast: breast lumps, nipple discharge, galactorrhea  GI: change in bowel habits, abdominal pain, black or bloody stools  : frequency, dysuria, hematuria, vaginal discharge  MSK: back pain, joint pain, muscle pain  Skin: itching, rash, hives  Neuro: dizziness, headache, confusion, weakness  Psych: anxiety, depression, change in mood  Heme/lymph: bleeding, bruising, pallor    Physical Exam  Visit Vitals  /85   Ht 5' 1\" (1.549 m)   Wt 145 lb 3.2 oz (65.9 kg)   BMI 27.44 kg/m²       Constitutional  Appearance: well-nourished, well developed, alert, in no acute distress    HENT  Head and Face: appears normal    Neck  Inspection/Palpation: normal appearance, no masses or tenderness  Lymph Nodes: no lymphadenopathy present  Thyroid: gland size normal, nontender, no nodules or masses present on palpation    Chest  Respiratory Effort: breathing unlabored  Auscultation: normal breath sounds    Cardiovascular  Heart:   Auscultation: regular rate and rhythm without murmur    Breasts  Inspection of Breasts: breasts symmetrical, no skin changes, no discharge present, nipple appearance normal, no skin retraction present  Palpation of Breasts and Axillae: no masses present on palpation, no breast tenderness  Axillary Lymph Nodes: no lymphadenopathy present    Gastrointestinal  Abdominal Examination: abdomen non-tender to palpation, normal bowel sounds, no masses present  Liver and spleen: no hepatomegaly present, spleen not palpable  Hernias: no hernias identified    Genitourinary  External Genitalia: normal appearance for age, no discharge present, no tenderness present, no inflammatory lesions present, no masses present, no atrophy present  Vagina: normal vaginal vault without central or paravaginal defects, white discharge present, no inflammatory lesions present, no masses present  Bladder: non-tender to palpation  Urethra: appears normal  Cervix: absent  Uterus: absent  Adnexa: no adnexal tenderness present, no adnexal masses present  Perineum: perineum within normal limits, no evidence of trauma, no rashes or skin lesions present  Anus: anus within normal limits, no hemorrhoids present  Inguinal Lymph Nodes: no lymphadenopathy present    Skin  General Inspection: no rash, no lesions identified    Neurologic/Psychiatric  Mental Status:  Orientation: grossly oriented to person, place and time  Mood and Affect: mood normal, affect appropriate    Labs: Wet prep + Clues + WBC Neg Yeast.  +    Assessment:    ICD-10-CM ICD-9-CM    1. Routine gynecological examination  Z01.419 V72.31       2. Osteoporosis screening  Z13.820 V82.81 DEXA BONE DENSITY STUDY AXIAL      3. BV (bacterial vaginosis)  N76.0 616.10     B96.89 041. 9           Plan:  Counseled re: diet, exercise, healthy lifestyle  Rec annual mammogram  Screening Colonoscopy advised.   Name & Number on tearoff sheet given for Dr. Tatianna Longo and/or Dr. Anson Wang BID x7  Return in about 1 year (around 12/6/2023) for Annual.

## 2022-12-07 ENCOUNTER — TELEPHONE (OUTPATIENT)
Dept: OBGYN CLINIC | Age: 52
End: 2022-12-07

## 2022-12-07 NOTE — TELEPHONE ENCOUNTER
----- Message from Rajesh Kruger MD sent at 12/6/2022  2:26 PM EST -----  Regarding: SRCP  Testosterone 2% cream

## 2022-12-07 NOTE — TELEPHONE ENCOUNTER
Phoned patient verified . Informed patient order will be faxed to [facility]. They will contact her. Patient given SRCP information.

## 2022-12-08 LAB
C TRACH RRNA SPEC QL NAA+PROBE: NEGATIVE
N GONORRHOEA RRNA SPEC QL NAA+PROBE: NEGATIVE
SPECIMEN STATUS REPORT, ROLRST: NORMAL
T VAGINALIS RRNA SPEC QL NAA+PROBE: NEGATIVE

## 2022-12-15 NOTE — PROGRESS NOTES
Cardiac Electrophysiology OFFICE Consultation Note       Assessment/Plan:   1. POTS (postural orthostatic tachycardia syndrome)  2. PSVT (paroxysmal supraventricular tachycardia) (HCC)  -     AMB POC EKG ROUTINE W/ 12 LEADS, INTER & REP  3. Sick sinus syndrome (HCC)  -     AMB POC EKG ROUTINE W/ 12 LEADS, INTER & REP  4. Cardiac pacemaker in situ  5. Idiopathic hypotension     POTS  Diagnosed with POTS at [facility]. Previously seen by Dr. Tena Britt at [facility]. seems to be controlled on midodrine 2.5 mg, Florinef, and Jobst stockings. She reports issues with occasional palpitations. I reviewed her ambulatory tracings recorded by her phone which demonstrated sinus rhythm in the 60s with PVCs or PACs. - I spoke to her in great details regarding the role of preventing polypharmacy and side effects  - I would like her to increase her routine cardio-exercise to facilitate improved autonomic tone. I think this will help with her mood and sleep. - continue Florinef and Midodrine for now  - FU with NP in 1 year  - FU with me in 18 months    SSS  S/p PPM on 1/7/22 with Dr. Azam Muhammad    Pacemaker  Implanted on 1/7/22 for sinus pauses >5 seconds and SSS. - will transition remote transition over to our clinic every 3 months    Idiopathic hypotension  -  Recommended aggressive hydration, especially during exercise, illness, or in hot environment. Recommended assuming the supine position in the setting of onset of prodrome symptoms. Consider TEDs stocking to improve venous return. - cont midodrine, fludrocortisone      Subjective:       Genoveva Bowie is a 50 y.o. patient who is seen for evaluation of  POTS. She is a pleasant 50 y.o. female who presents for evaluation of POTS with pacemaker. Diagnosed with POTS at [facility]. Previously seen by Dr. Tena Britt at [facility]. She was having trouble with sleep. She was started on Lunesta, then tried trazodone, and now she is back on Lunesta after having an allergic reaction on trazodone.  She is still only sleeping a few hours nightly. She developed migraines, may be from lack of sleep. Was on Amitriptyline but now is off of it. The POTS seems to be controlled on midodrine 2.5 mg, Florinef, and Jobst stockings. She reports issues with occasional palpitations. I reviewed her ambulatory tracings recorded by her phone which demonstrated sinus rhythm in the 60s with PVCs or PACs. Patient Active Problem List   Diagnosis Code    Sick sinus syndrome ([facility]) I49.5    Sinus pause I45.5    Cardiac pacemaker in situ Z95.0    Premature atrial contractions I49.1    Idiopathic hypotension I95.0    PSVT (paroxysmal supraventricular tachycardia) ([facility]) I47.1    Decreased libido R68.82    POTS (postural orthostatic tachycardia syndrome) G90. A     Current Outpatient Medications   Medication Sig Dispense Refill    buPROPion XL (WELLBUTRIN XL) 150 mg tablet TAKE 1 TABLET BY MOUTH EVERY DAY IN THE MORNING FOR 90 DAYS      butalbital-acetaminophen-caffeine (FIORICET, ESGIC) -40 mg per tablet TAKE 1 TABLET BY MOUTH THREE TIMES A DAY AS NEEDED      eszopiclone (LUNESTA) 3 mg tablet TAKE 1 TABLET IMMEDIATELY BEFORE BEDTIME ONCE A DAY FOR 30 DAYS      fludrocortisone (FLORINEF) 0.1 mg tablet Take 0.1 mg by mouth daily. midodrine (PROAMATINE) 2.5 mg tablet Take 2.5 mg by mouth daily. acyclovir (ZOVIRAX) 400 mg tablet Take 400 mg by mouth two (2) times a day. multivitamin (ONE A DAY) tablet Take 1 Tab by mouth daily. amitriptyline (ELAVIL) 25 mg tablet TAKE 1 TABLET BY MOUTH NIGHTLY. WAIT 1 WEEK AFTER STOPPING BUPROPION TO START TAKING THIS MEDICATION (Patient not taking: Reported on 12/16/2022)      traZODone (DESYREL) 50 mg tablet TAKE 1 TABLET BY MOUTH EVERY DAY AT BEDTIME AS NEEDED FOR 30 DAYS (Patient not taking: Reported on 12/16/2022)       Allergies   Allergen Reactions    Moderna Covid-19 Vaccine (Eua) [Covid-19 Vacc,Mrna(Moderna)-Pf] Other (comments)     Palpatations. DX with POTS.   Had pace maker 2022    Codeine Rash and Hives    Adhesive Tape-Silicones Rash    Zinc Itching and Rash     Past Medical History:   Diagnosis Date    Breast cancer ([facility] 2017    Depression     DV[facility]deep [facility] thrombosis) [facility]     Endometriosis     Headache     IBS (irritable bowel syndrome)     Thromboembolus ([facility]      Past Surgical History:   Procedure Laterality Date    HX ABDOMINOPLASTY      HX BREAST AUGMENTATION  2017    How they found breast cancer    HX  SECTION      HX COLONOSCOPY  2012    HX MASTECTOMY Bilateral 2017    HX ORTHOPAEDIC      knee and foot surgery    HX NENO AND BSO  1999    endometriosis ectopic     Family History   Problem Relation Age of Onset    Breast Cancer Maternal Aunt      Social History     Tobacco Use    Smoking status: Former     Packs/day: 0.00     Types: Cigarettes, Pipe     Quit date: 2015     Years since quittin.6    Smokeless tobacco: Never    Tobacco comments:     Vape occasionally   Substance Use Topics    Alcohol use: Yes     Comment: Don't drink weekly. Maybe 1-2 a month        Review of Systems:   12 point review of systems was performed.  All negative except for HPI     Objective:   /82 (BP 1 Location: Left upper arm, BP Patient Position: Sitting, BP Cuff Size: Adult)   Pulse 86   Resp 18   Ht 5' 1\" (1.549 m)   Wt 145 lb 6.4 oz (66 kg)   SpO2 99%   BMI 27.47 kg/m²     Physical Exam:   General:  Alert and oriented, in no acute distress  Head:  Atraumatic, normocephalic  Eyes:  extraocular muscles intact  Neck:  Supple, normal range of motion  Lungs:  Clear to auscultation bilaterally, no wheezes/rales/rhonchi   Cardiovascular:  Regular rate and rhythm, normal S1-S2, no murmurs/rubs/gallops  Abdomen:  Soft, nontender, nondistended, normoactive bowel sounds  Skin:  Intact, no rash  Extremities:, no clubbing, cyanosis, or edema  Musculoskeletal: normal range of motion  Neurological:  Alert and oriented, no focal neurologic deficits  Psychiatric: Normal mood and affect    No results found for: HBA1C, ECO2LNAH, MIO6FSYX, LLO9IVYZ  EKG: Sinus at 61 bpm. Normal axis. 03/24/22    ECHO ADULT COMPLETE 03/26/2022 3/26/2022    Interpretation Summary    Left Ventricle: Left ventricle size is normal. Normal wall thickness. Normal wall motion. Normal left ventricular systolic function. EF by 2D Simpsons Biplane is 59%. Global longitudinal strain is -21.2%. Normal diastolic function. Signed by: Mejia Ashraf MD on 3/26/2022  3:45 PM        Results for orders placed or performed during the hospital encounter of 02/26/21   EKG, 12 LEAD, INITIAL   Result Value Ref Range    Ventricular Rate 66 BPM    Atrial Rate 66 BPM    P-R Interval 152 ms    QRS Duration 94 ms    Q-T Interval 424 ms    QTC Calculation (Bezet) 444 ms    Calculated P Axis 69 degrees    Calculated R Axis 95 degrees    Calculated T Axis 91 degrees    Diagnosis       Sinus rhythm with marked sinus arrhythmia  Rightward axis  Borderline ECG  No previous ECGs available  Confirmed by Bill MAYA, Maureen Mcgill ([id number]) on 2/28/2021 9:33:25 PM               Thank you for involving me in this patient's care and please call with further concerns or questions. ________________________________________  An Ronna Funez MD, [facility]  Cardiac Electrophysiology  [facility] [place]  [address] [place] [address]                             [phone]     [address] [place] [address].  [address] [place] [address], [address] [facility]  [phone]

## 2022-12-16 ENCOUNTER — OFFICE VISIT (OUTPATIENT)
Dept: CARDIOLOGY CLINIC | Age: 52
End: 2022-12-16
Payer: COMMERCIAL

## 2022-12-16 VITALS
BODY MASS INDEX: 27.45 KG/M2 | RESPIRATION RATE: 18 BRPM | DIASTOLIC BLOOD PRESSURE: 82 MMHG | HEIGHT: 61 IN | SYSTOLIC BLOOD PRESSURE: 108 MMHG | HEART RATE: 86 BPM | OXYGEN SATURATION: 99 % | WEIGHT: 145.4 LBS

## 2022-12-16 DIAGNOSIS — Z95.0 CARDIAC PACEMAKER IN SITU: ICD-10-CM

## 2022-12-16 DIAGNOSIS — I49.5 SICK SINUS SYNDROME (HCC): ICD-10-CM

## 2022-12-16 DIAGNOSIS — I95.0 IDIOPATHIC HYPOTENSION: ICD-10-CM

## 2022-12-16 DIAGNOSIS — I47.1 PSVT (PAROXYSMAL SUPRAVENTRICULAR TACHYCARDIA) (HCC): ICD-10-CM

## 2022-12-16 DIAGNOSIS — G90.A POTS (POSTURAL ORTHOSTATIC TACHYCARDIA SYNDROME): Primary | ICD-10-CM

## 2023-01-17 ENCOUNTER — HOSPITAL ENCOUNTER (OUTPATIENT)
Dept: MAMMOGRAPHY | Age: 53
Discharge: HOME | End: 2023-01-17
Attending: OBSTETRICS & GYNECOLOGY
Payer: COMMERCIAL

## 2023-01-17 DIAGNOSIS — Z13.820 OSTEOPOROSIS SCREENING: ICD-10-CM

## 2023-01-17 PROCEDURE — 77080 DXA BONE DENSITY AXIAL: CPT

## 2023-01-23 ENCOUNTER — OFFICE VISIT (OUTPATIENT)
Dept: CARDIOLOGY CLINIC | Age: 53
End: 2023-01-23
Payer: COMMERCIAL

## 2023-01-23 DIAGNOSIS — Z95.0 CARDIAC PACEMAKER IN SITU: Primary | ICD-10-CM

## 2023-03-02 NOTE — TELEPHONE ENCOUNTER
11:39 AM   Attempted to reach patient by telephone. A message was left for return call. Fluconazole Counseling:  Patient counseled regarding adverse effects of fluconazole including but not limited to headache, diarrhea, nausea, upset stomach, liver function test abnormalities, taste disturbance, and stomach pain.  There is a rare possibility of liver failure that can occur when taking fluconazole.  The patient understands that monitoring of LFTs and kidney function test may be required, especially at baseline. The patient verbalized understanding of the proper use and possible adverse effects of fluconazole.  All of the patient's questions and concerns were addressed.

## 2023-04-03 ENCOUNTER — OFFICE VISIT (OUTPATIENT)
Dept: CARDIOLOGY CLINIC | Age: 53
End: 2023-04-03

## 2023-04-03 DIAGNOSIS — Z95.0 CARDIAC PACEMAKER IN SITU: Primary | ICD-10-CM

## 2023-05-05 PROBLEM — M47.22 OSTEOARTHRITIS OF SPINE WITH RADICULOPATHY, CERVICAL REGION: Status: ACTIVE | Noted: 2023-03-27

## 2023-05-11 ENCOUNTER — OFFICE VISIT (OUTPATIENT)
Age: 53
End: 2023-05-11
Payer: COMMERCIAL

## 2023-05-11 VITALS
HEIGHT: 62 IN | OXYGEN SATURATION: 99 % | DIASTOLIC BLOOD PRESSURE: 84 MMHG | SYSTOLIC BLOOD PRESSURE: 132 MMHG | WEIGHT: 144.5 LBS | BODY MASS INDEX: 26.59 KG/M2 | RESPIRATION RATE: 16 BRPM | HEART RATE: 62 BPM

## 2023-05-11 DIAGNOSIS — G43.019 INTRACTABLE MIGRAINE WITHOUT AURA AND WITHOUT STATUS MIGRAINOSUS: Primary | ICD-10-CM

## 2023-05-11 PROCEDURE — 99244 OFF/OP CNSLTJ NEW/EST MOD 40: CPT | Performed by: PSYCHIATRY & NEUROLOGY

## 2023-05-11 RX ORDER — RIMEGEPANT SULFATE 75 MG/75MG
TABLET, ORALLY DISINTEGRATING ORAL
Qty: 8 TABLET | Refills: 3 | Status: SHIPPED | OUTPATIENT
Start: 2023-05-11

## 2023-05-11 RX ORDER — ATOGEPANT 60 MG/1
1 TABLET ORAL DAILY
Qty: 30 TABLET | Refills: 3 | Status: SHIPPED | OUTPATIENT
Start: 2023-05-11

## 2023-05-11 RX ORDER — FLECAINIDE ACETATE 50 MG/1
TABLET ORAL
COMMUNITY

## 2023-05-11 RX ORDER — SUMATRIPTAN 100 MG/1
100 TABLET, FILM COATED ORAL DAILY PRN
COMMUNITY
Start: 2023-04-19

## 2023-05-11 NOTE — PROGRESS NOTES
[facility] and [address] at [facility] at [address] [facility] [address], [address] [facility] [address], [address] [place] [address]  [phone] Office  [phone]           Referring: Ariana Curran MD  [facility] [address],  [address] [facility]     Chief Complaint   Patient presents with    New Patient    Migraine     Started about 2 yrs ago. Photo/phonophobias, sig neck pain will occur, occasional nausea. Becoming more frequent. 3-4/30 days  Tried amitriptyline, bupropion, propranolol, sumatriptan, butalbitol     53-year-old lady who comes today for neurologic consultation regarding worsening headaches. She notes that she never was a headache person until about 2 years ago after she got her COVID-vaccine and then she started having periodic headaches. They have increased to the point that now she is having about 4/month lasting up to 2 days giving her approximately 8 headache days per month. She has tried Elavil, Wellbutrin, Inderal, metoprolol, Imitrex and Fioricet. She also believes she has tried [place] and [place]. None of those have helped. In addition she has cardiac disease having sick sinus syndrome with pacemaker placed. She has also had arrhythmia including SVT. Headaches are located in the back of the head base of the head and frontal region described as intense pain with associated photophobia phonophobia nausea. She does not vomit. She has to sequester herself in a dark room. No family history of migraine. No focal deficits. Patient has been seen by cardiac electrophysiology March 29, 2022. She been tried on Toprol as well as Cardizem. She was unable to tolerate that due to headache. She was also noted to have diagnosis of thromboembolus. She has cardiac pacemaker in place for sick sinus syndrome and has had paroxysmal supraventricular tachycardia and PACs.   No past

## 2023-05-12 ENCOUNTER — TELEPHONE (OUTPATIENT)
Age: 53
End: 2023-05-12

## 2023-07-31 ENCOUNTER — TELEPHONE (OUTPATIENT)
Age: 53
End: 2023-07-31

## 2023-07-31 NOTE — TELEPHONE ENCOUNTER
Pt is calling because she is concerned about her BP being elevated. Pt BP was 142/90. Pt said her BP is normally low.     Please advise    [phone]

## 2023-08-01 NOTE — TELEPHONE ENCOUNTER
Verified patient with two types of identifiers. Notified of NP recommendations. She will obtain a home blood pressure cuff and monitor BP while holding the Midodrine. Patient will return call if symptoms do not improve. Patient verbalized understanding and will call with any other questions.       Future Appointments   Date Time Provider [address]   8/7/2023  2:20 PM JACK Cisneros - NP NEUROWTCRSPJULIETTE BS AMB   11/2/2023  1:40 PM PACEMAKER3, RONALDO ZAPIEN AMB   11/2/2023  2:00 PM MD DERBA Ashton  AMB   12/19/2024 10:20 AM PACEMAKER3, RONALDO ZAPIEN AMB   12/19/2024 10:40 AM MD DEBRA Ashton  AMB

## 2023-08-01 NOTE — TELEPHONE ENCOUNTER
Returned patient call, ID verified using two patient identifiers. Ms. Dione Schaumann is calling because she has been told over the past two weeks that her blood pressure is elevated. She states that her BP is normally low. She saw her PCP last week and her ortho doctor this week and both times her BP was 140's/90's. She also endorses more frequent headaches. She does not check her BP at home but is planning on purchasing a blood pressure cuff. She is concerned over this change and wants to know if she should continue taking the florinef and midodrine as prescribed. Confirmed that Ms. Dione Schaumann is planning to continue to see Dr. Denis Sanders for her care. She saw Dr. Mayda Lawson in December and is unsure why. She wants to keep her appointment with Dr. Denis Sanders in November and is unsure if she needs to be seen sooner. Advised patient that I will notify the NP of her symptoms and call her back with recommendations. Patient verbalized understanding and will call back with any other questions or concerns.     Future Appointments   Date Time Provider [address] [place] [address] Ct   8/7/2023  2:20 PM JACK Barcenas - OLAYINKA NEUROWTCRSPB BS AMB   11/2/2023  1:40 PM PACEMAKER3, RONALDO ZAPIEN AMB   11/2/2023  2:00 PM MD DEBRA Akbar AMB   12/19/2024 10:20 AM PACEMAKER3, RONALDO ZAPIEN AMB   12/19/2024 10:40 AM MD DEBRA Akbar BS AMB

## 2023-08-07 ENCOUNTER — OFFICE VISIT (OUTPATIENT)
Age: 53
End: 2023-08-07
Payer: COMMERCIAL

## 2023-08-07 VITALS
SYSTOLIC BLOOD PRESSURE: 120 MMHG | HEART RATE: 78 BPM | DIASTOLIC BLOOD PRESSURE: 78 MMHG | OXYGEN SATURATION: 100 % | TEMPERATURE: 97.8 F | RESPIRATION RATE: 16 BRPM

## 2023-08-07 DIAGNOSIS — G43.019 INTRACTABLE MIGRAINE WITHOUT AURA AND WITHOUT STATUS MIGRAINOSUS: Primary | ICD-10-CM

## 2023-08-07 PROCEDURE — 99214 OFFICE O/P EST MOD 30 MIN: CPT | Performed by: NURSE PRACTITIONER

## 2023-08-07 RX ORDER — SUVOREXANT 10 MG/1
TABLET, FILM COATED ORAL
COMMUNITY
Start: 2023-07-26

## 2023-08-08 ENCOUNTER — CLINICAL DOCUMENTATION (OUTPATIENT)
Age: 53
End: 2023-08-08

## 2023-08-08 ENCOUNTER — TELEPHONE (OUTPATIENT)
Age: 53
End: 2023-08-08

## 2023-08-08 NOTE — PROGRESS NOTES
Received fax from [facility] for MRI order. Faxed signed form to [facility] MRI fax #[phone]. Received confirmation.

## 2023-08-08 NOTE — TELEPHONE ENCOUNTER
Ms. Sathya Crawford is low risk for significant cardiac complication regarding upcoming foot surgery. Echo in 03/2022 showed normal LVEF. We can check ECG in office prior to surgery if requested.

## 2023-08-08 NOTE — PROGRESS NOTES
Dario Salazar is a 50 y.o. female who presents with the following  Chief Complaint   Patient presents with    Migraine     Patient is not taking the nurtec. She states she has a headaches daily, just not bad migraines. HPI    Previously seen by Dr. Spears Bring  Here to evaluate medication  She is on Qulipta 60 mg  It has been working well to cut her migraines down  She does still have some type of a daily headache  No real bad migraines though  They have not changed in any other way  She does have Fioricet  She is on Wellbutrin  She is not taking the Nurtec  She has had a lot of issues post COVID-vaccine  She is still following with cardiology and cardio electrophysiology  She does have a pacemaker as of January 2022  No loss of consciousness          Allergies   Allergen Reactions    Covid-19 (Mrna) Vaccine Other (See Comments)     Palpatations. DX with POTS. Had pace maker 1/2022    Codeine Hives and Rash    Adhesive Tape Rash    Zinc Itching and Rash       Current Outpatient Medications   Medication Sig Dispense Refill    BELSOMRA 10 MG TABS TAKE 1 TABLET BY MOUTH AT BEDTIME AS NEEDED FOR 30 DAYS      flecainide (TAMBOCOR) 50 MG tablet flecainide 50 mg tablet      Calcium-Magnesium-Zinc 333-133-5 MG TABS Take by mouth      Atogepant (QULIPTA) 60 MG TABS Take 1 tablet by mouth daily 30 tablet 3    acyclovir (ZOVIRAX) 400 MG tablet Take by mouth 2 times daily      buPROPion (WELLBUTRIN XL) 150 MG extended release tablet TAKE 1 TABLET BY MOUTH EVERY DAY IN THE MORNING FOR 90 DAYS      butalbital-acetaminophen-caffeine (FIORICET, ESGIC) -40 MG per tablet        No current facility-administered medications for this visit. Social History     Tobacco Use   Smoking Status Former    Types: Cigarettes   Smokeless Tobacco Never       No past medical history on file. No past surgical history on file. No family history on file.     Social History     Socioeconomic History    Marital status:

## 2023-08-08 NOTE — TELEPHONE ENCOUNTER
Called patient, ID verified using two patient identifiers. Patient is scheduled for surgery with Dr. Palua Li on 9/29/23. She is having bone in left foot removed  and pins placed. She needs cardiac clearance. She is going to follow up with Dr. Maryanne Dong. Advised her that I would relay all information to Dr. Maryanne Dong and Elizabet Brenner. Yon Riley NP. She needs to know if she needs to be seen in the office prior to clearance being given. She also wanted to let Stephani Licea know that she stopped the Midodrine and that her BP is running around 114/86.      Dr. Paula Li  Ph # [phone]  Fax # [phone]    Patient # Pt # [phone]

## 2023-08-08 NOTE — TELEPHONE ENCOUNTER
Verified patient with two types of identifiers. Scheduled patient for cardiac clearance with David Thorne NP. Patient verbalized understanding and will call with any other questions.       Future Appointments   Date Time Provider [facility] [address]   9/7/2023  9:00 AM JACK Keenan NP BS AMB   11/2/2023  1:40 PM PACEMAKER3, RONALDO RICHARDSON BS AMB   11/2/2023  2:00 PM Hollie Phoenix, MD CAVREY BS AMB   2/5/2024  2:40 PM JACK Gordillo NP NEUROWTCRSPB BS AMB   12/19/2024 10:20 AM PACEMAKER3, RONALDO ZAPIEN AMB   12/19/2024 10:40 AM Hollie Phoenix, MD CAVREY BS AMB

## 2023-08-09 ENCOUNTER — TRANSCRIBE ORDERS (OUTPATIENT)
Facility: HOSPITAL | Age: 53
End: 2023-08-09

## 2023-08-09 DIAGNOSIS — M47.812 CERVICAL SPONDYLOSIS: Primary | ICD-10-CM

## 2023-08-17 ENCOUNTER — HOSPITAL ENCOUNTER (OUTPATIENT)
Facility: HOSPITAL | Age: 53
Discharge: HOME | End: 2023-08-17
Attending: ORTHOPAEDIC SURGERY
Payer: COMMERCIAL

## 2023-08-17 DIAGNOSIS — M47.812 CERVICAL SPONDYLOSIS: ICD-10-CM

## 2023-08-17 PROCEDURE — 72141 MRI NECK SPINE W/O DYE: CPT

## 2023-08-24 ENCOUNTER — TELEPHONE (OUTPATIENT)
Age: 53
End: 2023-08-24

## 2023-08-24 NOTE — TELEPHONE ENCOUNTER
----- Message from Mickey Coto sent at 2023  6:57 PM EDT -----  Regarding: Jeffy Francis: [phone]  I have been using a coupon card that is now  and insurance doesn't cover without a prior auth. They said it's non covered so you'll have to ask for an exception. I was informed you can get the form on covermymeds and faxing to [phone] or by calling [phone]. If it's done as Urgent the turn around time is 72 hours, otherwise it can be 30 days. I've only had 1 Migraine since being on it so I'm assuming Juan Robertson is working. I'd like to remain on it if at all possible but if not I'll need to get something that is covered by insurance. Please let me know the next steps.  Thank you

## 2023-08-24 NOTE — TELEPHONE ENCOUNTER
Kade MERAZ initiated through Cover my meds key # -  [id number] - PA Case ID: [id number]    Approvedtoday  PA Case: [id number], Status: Approved, Coverage Starts on: 8/24/2023 12:00:00 AM, Coverage Ends on: 8/23/2024 12:00:00 AM.

## 2023-08-30 NOTE — PROGRESS NOTES
[facility] Cardiology  Cardiac Electrophysiology Clinic Care Note                  []Initial visit     [x]Established visit     Patient Name: Kelly Culp - CGT:46/95/7693 - QKX:[id number]  Primary Cardiologist: None  Electrophysiologist: Be Lozoya MD     Reason for visit: Preoperative cardiac risk stratification    HPI:  Ms. Micheline Perea is a 50 y.o. female who presents for preoperative cardiac risk stratification prior to upcoming planned foot surgery. She reports continued episodic palpitations with some chest discomfort, states she now has diaphoresis during these episodes, which is different for her. She notes significant fatigue, but states she's able to run & exercise on her good days. States she is no longer on flecainide. ECG today shows AV pacing 92 bpm with QRSd 92 ms, negative precordial T waves. Echo in 03/2022 showed normal LVEF. Holter in the past showed 3% PACs, few PVC. Stress echo was reported negative for ischemia by Dr Alberta Gasca. On Florinef for BP support with diagnosis of POTS. Dr. Humaira Mckeon previously recommended increased cardio to improve autonomic tone. BP controlled today. Prior DVT, but no longer on anticoagulant. Previous:  Medtronic dual chamber pacemaker (DOI 01/07/2022). DURAN & dizziness at rest since COVID vaccine. Diagnosed with POTS at [facility], previously seen by Dr. Tabby Shane at [facility]. History of DVT post knee surgery. Palpitations, likely PVCs after 3rd pregnancy. [place]. Works in [employer] with Dr Alberta Gasca and Steffany Ortega. Assessment and Plan     Medtronic dual chamber pacemaker (DOI 01/07/2022): Implanted for SSS. Device check on 07/30/2023 showed proper lead & generator function. Generator longevity estimated 12.5 years. RA 45.78%, RV 8.14%. AT/AF burden 0%. POTS: Diagnosed at [facility]. Currently controlled with Florinef, & compressions stockings. She will continue as previously prescribed.     Fatigue: Check

## 2023-09-07 ENCOUNTER — OFFICE VISIT (OUTPATIENT)
Age: 53
End: 2023-09-07
Payer: COMMERCIAL

## 2023-09-07 VITALS
WEIGHT: 148 LBS | HEIGHT: 62 IN | HEART RATE: 66 BPM | BODY MASS INDEX: 27.23 KG/M2 | RESPIRATION RATE: 18 BRPM | DIASTOLIC BLOOD PRESSURE: 88 MMHG | SYSTOLIC BLOOD PRESSURE: 128 MMHG | OXYGEN SATURATION: 99 %

## 2023-09-07 DIAGNOSIS — R07.89 CHEST DISCOMFORT: ICD-10-CM

## 2023-09-07 DIAGNOSIS — Z01.810 PREOPERATIVE CARDIOVASCULAR EXAMINATION: ICD-10-CM

## 2023-09-07 DIAGNOSIS — Z95.0 PACEMAKER: ICD-10-CM

## 2023-09-07 DIAGNOSIS — R00.2 PALPITATIONS: ICD-10-CM

## 2023-09-07 DIAGNOSIS — I49.1 PAC (PREMATURE ATRIAL CONTRACTION): ICD-10-CM

## 2023-09-07 DIAGNOSIS — I49.5 SICK SINUS SYNDROME (HCC): ICD-10-CM

## 2023-09-07 DIAGNOSIS — G90.A POTS (POSTURAL ORTHOSTATIC TACHYCARDIA SYNDROME): ICD-10-CM

## 2023-09-07 DIAGNOSIS — R61 DIAPHORESIS: ICD-10-CM

## 2023-09-07 DIAGNOSIS — I49.3 PVC (PREMATURE VENTRICULAR CONTRACTION): ICD-10-CM

## 2023-09-07 DIAGNOSIS — Z01.810 PREOPERATIVE CARDIOVASCULAR EXAMINATION: Primary | ICD-10-CM

## 2023-09-07 PROCEDURE — 99214 OFFICE O/P EST MOD 30 MIN: CPT | Performed by: NURSE PRACTITIONER

## 2023-09-07 PROCEDURE — 93000 ELECTROCARDIOGRAM COMPLETE: CPT | Performed by: NURSE PRACTITIONER

## 2023-09-07 RX ORDER — CALCIUM CARBONATE 500(1250)
1000 TABLET ORAL DAILY
COMMUNITY

## 2023-09-07 RX ORDER — LANOLIN ALCOHOL/MO/W.PET/CERES
5000 CREAM (GRAM) TOPICAL DAILY
COMMUNITY

## 2023-09-07 RX ORDER — ESZOPICLONE 3 MG/1
TABLET, FILM COATED ORAL
COMMUNITY
Start: 2023-08-29

## 2023-09-07 RX ORDER — MULTIVITAMIN WITH IRON
250 TABLET ORAL DAILY
COMMUNITY

## 2023-09-07 RX ORDER — M-VIT,TX,IRON,MINS/CALC/FOLIC 27MG-0.4MG
1 TABLET ORAL DAILY
COMMUNITY

## 2023-09-07 RX ORDER — FLUDROCORTISONE ACETATE 0.1 MG/1
TABLET ORAL DAILY
COMMUNITY
Start: 2023-08-31

## 2023-09-07 RX ORDER — ACETAMINOPHEN 160 MG
TABLET,DISINTEGRATING ORAL DAILY
COMMUNITY

## 2023-09-07 ASSESSMENT — PATIENT HEALTH QUESTIONNAIRE - PHQ9
SUM OF ALL RESPONSES TO PHQ QUESTIONS 1-9: 0
SUM OF ALL RESPONSES TO PHQ9 QUESTIONS 1 & 2: 0
1. LITTLE INTEREST OR PLEASURE IN DOING THINGS: 0
2. FEELING DOWN, DEPRESSED OR HOPELESS: 0
SUM OF ALL RESPONSES TO PHQ QUESTIONS 1-9: 0

## 2023-09-07 NOTE — PROGRESS NOTES
Room #: 2    C/o palpitations, hot flashes, dizziness and right sided chest pain. Chief Complaint   Patient presents with    Cardiac Clearance       Vitals:    09/07/23 0914   BP: 128/88   Site: Left Upper Arm   Position: Sitting   Cuff Size: Medium Adult   Pulse: 66   Resp: 18   SpO2: 99%   Weight: 148 lb (67.1 kg)   Height: 5' 2\" (1.575 m)         Chest pain:  YES  Shortness of breath:  NO  Edema: NO  Palpitations, skipped beats, rapid heartbeat:  YES  Dizziness:  YES    1. Have you been to the ER, urgent care clinic since your last visit? Hospitalized since your last visit? NO    2. Have you seen or consulted any other health care providers outside of the [address] [place] [address] since your last visit? Include any pap smears or colon screening.  NO      Refills:  NO

## 2023-09-14 ENCOUNTER — TELEPHONE (OUTPATIENT)
Age: 53
End: 2023-09-14

## 2023-09-14 NOTE — TELEPHONE ENCOUNTER
----- Message from JACK Maguire NP sent at 9/14/2023  7:50 AM EDT -----  Nuclear stress test showed LVEF 61% with no ischemia or infarct. Exercise capacity was excellent. No change in treatment plan based on results. Will update recent clinic note & forward to Dr. Phillip Briones.

## 2023-09-14 NOTE — TELEPHONE ENCOUNTER
----- Message from JACK Morales - NP sent at 9/14/2023  7:50 AM EDT -----  Nuclear stress test showed LVEF 61% with no ischemia or infarct. Exercise capacity was excellent. No change in treatment plan based on results. Will update recent clinic note & forward to Dr. Isaac Rojo

## 2023-09-21 ENCOUNTER — HOSPITAL ENCOUNTER (OUTPATIENT)
Facility: HOSPITAL | Age: 53
Discharge: HOME | End: 2023-09-21
Payer: COMMERCIAL

## 2023-09-21 VITALS
WEIGHT: 147.3 LBS | HEART RATE: 65 BPM | DIASTOLIC BLOOD PRESSURE: 76 MMHG | TEMPERATURE: 97.1 F | BODY MASS INDEX: 27.1 KG/M2 | OXYGEN SATURATION: 98 % | RESPIRATION RATE: 18 BRPM | SYSTOLIC BLOOD PRESSURE: 115 MMHG | HEIGHT: 62 IN

## 2023-09-21 DIAGNOSIS — G43.019 INTRACTABLE MIGRAINE WITHOUT AURA AND WITHOUT STATUS MIGRAINOSUS: Primary | ICD-10-CM

## 2023-09-21 LAB
ANION GAP SERPL CALC-SCNC: 3 MMOL/L (ref 5–15)
BUN SERPL-MCNC: 13 MG/DL (ref 6–20)
BUN/CREAT SERPL: 18 (ref 12–20)
CALCIUM SERPL-MCNC: 9.3 MG/DL (ref 8.5–10.1)
CHLORIDE SERPL-SCNC: 107 MMOL/L (ref 97–108)
CO2 SERPL-SCNC: 32 MMOL/L (ref 21–32)
CREAT SERPL-MCNC: 0.74 MG/DL (ref 0.55–1.02)
GLUCOSE SERPL-MCNC: 93 MG/DL (ref 65–100)
POTASSIUM SERPL-SCNC: 4.4 MMOL/L (ref 3.5–5.1)
SODIUM SERPL-SCNC: 142 MMOL/L (ref 136–145)

## 2023-09-21 PROCEDURE — 80048 BASIC METABOLIC PNL TOTAL CA: CPT

## 2023-09-21 PROCEDURE — 36415 COLL VENOUS BLD VENIPUNCTURE: CPT

## 2023-09-21 RX ORDER — ACETAMINOPHEN 500 MG
1000 TABLET ORAL EVERY 6 HOURS PRN
COMMUNITY

## 2023-09-21 RX ORDER — ATOGEPANT 60 MG/1
1 TABLET ORAL DAILY
Qty: 30 TABLET | Refills: 5 | Status: SHIPPED | OUTPATIENT
Start: 2023-09-21

## 2023-09-21 ASSESSMENT — ENCOUNTER SYMPTOMS
ABDOMINAL PAIN: 0
SHORTNESS OF BREATH: 0
BLOOD IN STOOL: 0
SORE THROAT: 0
VOMITING: 0
TROUBLE SWALLOWING: 0
NAUSEA: 0
COUGH: 0
WHEEZING: 0

## 2023-09-21 NOTE — H&P
Malia Street was referred for evaluation by:Dr. Sruthi Boogie for Pre- Op Evaluation. Please see encounter details and orders for consultative summary. Type of surgery :Fifth Metatarsal Osteotomy, Left foot  Surgery site : Left foot  Anesthesia type: MAC/Local  Date of procedure:  9/29/2023    This 50y.o. year old female presents with complaints of pain to left fifth toe that has been progressively worsening; reports she had broken this toe in the past and has been having more pain when barefoot. Has exhausted conservative measures before considering surgery. Patient has discussed the risks, alternatives, and benefits of the surgery with surgeon and has elected to proceed with surgical intervention. History of PSVT, SSS, POTS and had pacemaker implanted in 1/2022. Reports she developed cardiac issues after receiving 2 Moderna Covid vaccines in 2021. Has recently seen Cardiology who sent her for a nuclear stress test on 9/12/23 which was negative for ischemia and EF was 61%. Has been cleared from cardiac standpoint for surgery. Hx DVT after having knee surgery in 2012 and was on Warfarin for 6 months. Denies subsequent episodes. Allergies: Allergies   Allergen Reactions    Covid-19 (Mrna) Vaccine Other (See Comments)     Palpatations. DX with POTS.   Had pace maker 1/2022    Codeine Hives and Rash    Adhesive Tape Rash    Zinc Itching and Rash     Latex allergy: no  Prior reactions to anesthesia:  None     Current Outpatient Medications   Medication Sig    acetaminophen (TYLENOL) 500 MG tablet Take 2 tablets by mouth every 6 hours as needed for Pain    Naproxen Sodium (ALEVE PO) Take 1 tablet by mouth 2 times daily as needed    eszopiclone (LUNESTA) 3 MG TABS TAKE 1 TABLET IMMEDIATELY BEFORE BEDTIME ONCE A DAY FOR 30 DAYS    fludrocortisone (FLORINEF) 0.1 MG tablet Take by mouth daily    Multiple Vitamins-Minerals (THERAPEUTIC MULTIVITAMIN-MINERALS) tablet Take 1 tablet by mouth daily

## 2023-09-22 ENCOUNTER — TELEPHONE (OUTPATIENT)
Age: 53
End: 2023-09-22

## 2023-09-22 NOTE — TELEPHONE ENCOUNTER
Faxed request for perioperative ICD and Pacemaker management to [facility] pre admission at fax number [phone]. Fax confirmation received.

## 2023-09-28 ENCOUNTER — ANESTHESIA EVENT (OUTPATIENT)
Facility: HOSPITAL | Age: 53
End: 2023-09-28
Payer: COMMERCIAL

## 2023-09-29 ENCOUNTER — ANESTHESIA (OUTPATIENT)
Facility: HOSPITAL | Age: 53
End: 2023-09-29
Payer: COMMERCIAL

## 2023-09-29 ENCOUNTER — HOSPITAL ENCOUNTER (OUTPATIENT)
Facility: HOSPITAL | Age: 53
Setting detail: OUTPATIENT SURGERY
Discharge: HOME | End: 2023-09-29
Attending: PODIATRIST | Admitting: PODIATRIST
Payer: COMMERCIAL

## 2023-09-29 ENCOUNTER — APPOINTMENT (OUTPATIENT)
Facility: HOSPITAL | Age: 53
End: 2023-09-29
Attending: PODIATRIST
Payer: COMMERCIAL

## 2023-09-29 VITALS
OXYGEN SATURATION: 100 % | HEIGHT: 62 IN | BODY MASS INDEX: 27.55 KG/M2 | DIASTOLIC BLOOD PRESSURE: 83 MMHG | SYSTOLIC BLOOD PRESSURE: 124 MMHG | RESPIRATION RATE: 12 BRPM | HEART RATE: 61 BPM | WEIGHT: 149.69 LBS | TEMPERATURE: 97.5 F

## 2023-09-29 DIAGNOSIS — M79.672 FOOT PAIN, LEFT: Primary | ICD-10-CM

## 2023-09-29 PROBLEM — M21.622 TAILOR'S BUNION OF LEFT FOOT: Status: ACTIVE | Noted: 2023-09-29

## 2023-09-29 PROCEDURE — 2580000003 HC RX 258: Performed by: PODIATRIST

## 2023-09-29 PROCEDURE — 2720000010 HC SURG SUPPLY STERILE: Performed by: PODIATRIST

## 2023-09-29 PROCEDURE — C1769 GUIDE WIRE: HCPCS | Performed by: PODIATRIST

## 2023-09-29 PROCEDURE — 7100000010 HC PHASE II RECOVERY - FIRST 15 MIN: Performed by: PODIATRIST

## 2023-09-29 PROCEDURE — C1713 ANCHOR/SCREW BN/BN,TIS/BN: HCPCS | Performed by: PODIATRIST

## 2023-09-29 PROCEDURE — 3600000014 HC SURGERY LEVEL 4 ADDTL 15MIN: Performed by: PODIATRIST

## 2023-09-29 PROCEDURE — 73630 X-RAY EXAM OF FOOT: CPT

## 2023-09-29 PROCEDURE — 7100000011 HC PHASE II RECOVERY - ADDTL 15 MIN: Performed by: PODIATRIST

## 2023-09-29 PROCEDURE — 2709999900 HC NON-CHARGEABLE SUPPLY: Performed by: PODIATRIST

## 2023-09-29 PROCEDURE — 2580000003 HC RX 258: Performed by: ANESTHESIOLOGY

## 2023-09-29 PROCEDURE — 6360000002 HC RX W HCPCS: Performed by: PODIATRIST

## 2023-09-29 PROCEDURE — 2500000003 HC RX 250 WO HCPCS: Performed by: NURSE ANESTHETIST, CERTIFIED REGISTERED

## 2023-09-29 PROCEDURE — 6360000002 HC RX W HCPCS: Performed by: NURSE ANESTHETIST, CERTIFIED REGISTERED

## 2023-09-29 PROCEDURE — 3700000001 HC ADD 15 MINUTES (ANESTHESIA): Performed by: PODIATRIST

## 2023-09-29 PROCEDURE — 3600000004 HC SURGERY LEVEL 4 BASE: Performed by: PODIATRIST

## 2023-09-29 PROCEDURE — 3700000000 HC ANESTHESIA ATTENDED CARE: Performed by: PODIATRIST

## 2023-09-29 DEVICE — SCREW BNE L22MM DIA2.5MM MIC FT ANK TI SELF DRL ST CANN: Type: IMPLANTABLE DEVICE | Site: FOOT | Status: FUNCTIONAL

## 2023-09-29 RX ORDER — LIDOCAINE HYDROCHLORIDE 10 MG/ML
1 INJECTION, SOLUTION EPIDURAL; INFILTRATION; INTRACAUDAL; PERINEURAL
Status: DISCONTINUED | OUTPATIENT
Start: 2023-09-29 | End: 2023-09-29 | Stop reason: HOSPADM

## 2023-09-29 RX ORDER — OXYCODONE HYDROCHLORIDE AND ACETAMINOPHEN 5; 325 MG/1; MG/1
1 TABLET ORAL EVERY 6 HOURS PRN
Qty: 28 TABLET | Refills: 0 | Status: SHIPPED | OUTPATIENT
Start: 2023-09-29 | End: 2023-10-06

## 2023-09-29 RX ORDER — BUPIVACAINE HYDROCHLORIDE 5 MG/ML
INJECTION, SOLUTION EPIDURAL; INTRACAUDAL PRN
Status: DISCONTINUED | OUTPATIENT
Start: 2023-09-29 | End: 2023-09-29 | Stop reason: ALTCHOICE

## 2023-09-29 RX ORDER — PROPOFOL 10 MG/ML
INJECTION, EMULSION INTRAVENOUS PRN
Status: DISCONTINUED | OUTPATIENT
Start: 2023-09-29 | End: 2023-09-29 | Stop reason: SDUPTHER

## 2023-09-29 RX ORDER — MIDAZOLAM HYDROCHLORIDE 2 MG/2ML
2 INJECTION, SOLUTION INTRAMUSCULAR; INTRAVENOUS
Status: DISCONTINUED | OUTPATIENT
Start: 2023-09-29 | End: 2023-09-29 | Stop reason: HOSPADM

## 2023-09-29 RX ORDER — LIDOCAINE HYDROCHLORIDE 20 MG/ML
INJECTION, SOLUTION EPIDURAL; INFILTRATION; INTRACAUDAL; PERINEURAL PRN
Status: DISCONTINUED | OUTPATIENT
Start: 2023-09-29 | End: 2023-09-29 | Stop reason: SDUPTHER

## 2023-09-29 RX ORDER — SODIUM CHLORIDE, SODIUM LACTATE, POTASSIUM CHLORIDE, CALCIUM CHLORIDE 600; 310; 30; 20 MG/100ML; MG/100ML; MG/100ML; MG/100ML
INJECTION, SOLUTION INTRAVENOUS CONTINUOUS
Status: DISCONTINUED | OUTPATIENT
Start: 2023-09-29 | End: 2023-09-29 | Stop reason: HOSPADM

## 2023-09-29 RX ORDER — ONDANSETRON 2 MG/ML
4 INJECTION INTRAMUSCULAR; INTRAVENOUS
Status: DISCONTINUED | OUTPATIENT
Start: 2023-09-29 | End: 2023-09-29 | Stop reason: HOSPADM

## 2023-09-29 RX ORDER — FENTANYL CITRATE 50 UG/ML
100 INJECTION, SOLUTION INTRAMUSCULAR; INTRAVENOUS
Status: DISCONTINUED | OUTPATIENT
Start: 2023-09-29 | End: 2023-09-29 | Stop reason: HOSPADM

## 2023-09-29 RX ORDER — FENTANYL CITRATE 50 UG/ML
INJECTION, SOLUTION INTRAMUSCULAR; INTRAVENOUS PRN
Status: DISCONTINUED | OUTPATIENT
Start: 2023-09-29 | End: 2023-09-29 | Stop reason: SDUPTHER

## 2023-09-29 RX ORDER — DIPHENHYDRAMINE HYDROCHLORIDE 50 MG/ML
12.5 INJECTION INTRAMUSCULAR; INTRAVENOUS
Status: DISCONTINUED | OUTPATIENT
Start: 2023-09-29 | End: 2023-09-29 | Stop reason: HOSPADM

## 2023-09-29 RX ORDER — MIDAZOLAM HYDROCHLORIDE 1 MG/ML
INJECTION INTRAMUSCULAR; INTRAVENOUS PRN
Status: DISCONTINUED | OUTPATIENT
Start: 2023-09-29 | End: 2023-09-29 | Stop reason: SDUPTHER

## 2023-09-29 RX ADMIN — LIDOCAINE HYDROCHLORIDE 100 MG: 20 INJECTION, SOLUTION EPIDURAL; INFILTRATION; INTRACAUDAL; PERINEURAL at 07:50

## 2023-09-29 RX ADMIN — FENTANYL CITRATE 100 MCG: 50 INJECTION, SOLUTION INTRAMUSCULAR; INTRAVENOUS at 07:42

## 2023-09-29 RX ADMIN — CEFAZOLIN SODIUM 2000 MG: 1 POWDER, FOR SOLUTION INTRAMUSCULAR; INTRAVENOUS at 07:58

## 2023-09-29 RX ADMIN — SODIUM CHLORIDE, POTASSIUM CHLORIDE, SODIUM LACTATE AND CALCIUM CHLORIDE: 600; 310; 30; 20 INJECTION, SOLUTION INTRAVENOUS at 06:08

## 2023-09-29 RX ADMIN — MIDAZOLAM HYDROCHLORIDE 2 MG: 1 INJECTION, SOLUTION INTRAMUSCULAR; INTRAVENOUS at 07:42

## 2023-09-29 RX ADMIN — PROPOFOL 100 MCG/KG/MIN: 10 INJECTION, EMULSION INTRAVENOUS at 07:52

## 2023-09-29 RX ADMIN — PROPOFOL 100 MG: 10 INJECTION, EMULSION INTRAVENOUS at 07:50

## 2023-09-29 ASSESSMENT — PAIN DESCRIPTION - DESCRIPTORS: DESCRIPTORS: SHARP;BURNING

## 2023-09-29 ASSESSMENT — PAIN - FUNCTIONAL ASSESSMENT
PAIN_FUNCTIONAL_ASSESSMENT: 0-10
PAIN_FUNCTIONAL_ASSESSMENT: ACTIVITIES ARE NOT PREVENTED

## 2023-09-29 NOTE — DISCHARGE INSTRUCTIONS
Dr. Heavenly Milligan. MARIELOS Bravo FACFAS FACFAOM FACCWS    The [facility]  Post Operative Instructions: You have had a surgical procedure on your left foot. Fluids and Diet:  Begin with clear liquids, broth, dry toast, and crackers. If not nauseated then resume your regular pre-operative diet when you are ready    Medications: Take your prescriptions as directed  If your pain is not severe then you may take the non-prescription medication that you normally take for aches and pains  You may resume your regularly scheduled medications (unless otherwise directed)  If any side effects or adverse reactions occur, discontinue the medication and contact your doctor. Review the patient drug information that is provided before you take any medication    Ambulation and Activity:  You are advised to go directly home from the hospital  Use crutches, walker as needed  You may put weight on the operated foot. You should wear the surgical shoe at all times when awake. Avoid stairs if possible. Do not lift or move heavy objects  Do not drive until cleared by Dr. Nicola Litten and Wound Care Instructions:  Keep bandage clean and dry  Do not shower or bathe the operative extremity  Do not remove the bandage (unless otherwise directed)  Do not attempt to put anything between the cast or dressing and your skin, some itching is normal.    Ice and Elevation:  Elevate operative extremity as much as possible to reduce swelling and discomfort. Elevate with 2 pillows at or above the level of the heart for the first 72 hours. Ice:  [facility] dispensed insulated ice bag over the bandage 20 minutes of every hour while awake for the first 72 hours. You may behind the knee as well. Special Instructions: Call your doctor immediately if you develop any of the following. Fever over 100 degrees by mouth - take your temperature daily until your first follow up visit.   Pain not relieved by medication ordered  Swelling, increased redness, warmth, or hardness around operative area. Numb, tingling or cold toes. Toe(s) become white or bluish  Bandage becomes wet, soiled, or blood soaked (small amount of bleeding may be normal)  Increased or progressive drainage from surgical area. Follow up instructions: Your first post operative appointment is scheduled for Tuesday    Call Dr. Mali Rodgers office if you have any questions or concerns.

## 2023-09-29 NOTE — BRIEF OP NOTE
Brief Postoperative Note      Patient: Samir Aburto  YOB: 1970  MRN: [id number]    Date of Procedure: 9/29/2023    Pre-Op Diagnosis Codes:     * Jose armando, left [M21.622]    Post-Op Diagnosis: Same       Procedure(s): FIFTH METATARSAL OSTEOTOMY LEFT FOOT (MAC/LOCAL)    Surgeon(s):  Margaret Bravo DPM    Assistant:  Surgical Assistant: Mana Lopez    Anesthesia: Monitor Anesthesia Care    Estimated Blood Loss (mL): Minimal    Complications: None    Specimens:   * No specimens in log *    Implants:  Implant Name Type Inv.  Item Serial No.  Lot No. LRB No. Used Action   2.5 X22 SCREW   NA  NA Left 1 Implanted   Arthrex      Drains: * No LDAs found *    Findings: lateral and plantar prominence 5th metatarsal head left foot      Electronically signed by Lucian Sherman DPM on 9/29/2023 at 8:37 AM

## 2023-09-29 NOTE — INTERVAL H&P NOTE
Update History & Physical    The patient's History and Physical of September 21, 2023 was reviewed with the patient and I examined the patient. There was no change. The surgical site was confirmed by the patient and me. Plan: The risks, benefits, expected outcome, and alternative to the recommended procedure have been discussed with the patient. Patient understands and wants to proceed with the procedure.      Electronically signed by Diana Gay DPM on 9/29/2023 at 7:39 AM

## 2023-09-29 NOTE — OP NOTE
Operative Note      Patient: Dinora Palomo  YOB: 1970  MRN: [id number]    Date of Procedure: 9/29/2023    Pre-Op Diagnosis Codes:     * Jose armando, left [M21.622]    Post-Op Diagnosis: {MH OR QKMJ:[id number]}       Procedure(s): FIFTH METATARSAL OSTEOTOMY LEFT FOOT (MAC/LOCAL)    Surgeon(s):  Gurjit Bravo DPM    Assistant:   Surgical Assistant: Roseanna Cano    Anesthesia: Monitor Anesthesia Care    Estimated Blood Loss (mL): {NUMBERS; OZI:84448}    Complications: {Symptoms; Intra-op complications:23046}    Specimens:   * No specimens in log *    Implants:  Implant Name Type Inv.  Item Serial No.  Lot No. LRB No. Used Action   2.5 X22 SCREW   NA  NA Left 1 Implanted         Drains: * No LDAs found *    Findings: ***        Detailed Description of Procedure:   ***    Electronically signed by Dorene Canavan, DPM on 9/29/2023 at 8:40 AM

## 2023-09-29 NOTE — ANESTHESIA POSTPROCEDURE EVALUATION
Department of Anesthesiology  Postprocedure Note    Patient: Atul Cunha  MRN: [id number]  YOB: 1970  Date of evaluation: 9/29/2023      Procedure Summary     Date: 09/29/23 Room / Location: [facility] MAIN OR F2 / [facility] MAIN OR    Anesthesia Start: 8822 Anesthesia Stop: 9434    Procedure: FIFTH METATARSAL OSTEOTOMY LEFT FOOT (MAC/LOCAL) (Left: Foot) Diagnosis:       Tailor's bunionette, left      (Tailor's bunionette, left [M21.622])    Surgeons: Herlinda Bravo DPM Responsible Provider: Jeronimo Schofield MD    Anesthesia Type: TIVA, MAC ASA Status: 3          Anesthesia Type: No value filed.     Major Phase I: Major Score: 9    Major Phase II:        Anesthesia Post Evaluation    Patient location during evaluation: bedside  Airway patency: patent  Nausea & Vomiting: no nausea and no vomiting  Complications: no  Cardiovascular status: hemodynamically stable  Respiratory status: acceptable  Comments: VITALS REVIEWED

## 2023-11-02 ENCOUNTER — PROCEDURE VISIT (OUTPATIENT)
Age: 53
End: 2023-11-02

## 2023-11-02 ENCOUNTER — OFFICE VISIT (OUTPATIENT)
Age: 53
End: 2023-11-02

## 2023-11-02 VITALS
WEIGHT: 148 LBS | SYSTOLIC BLOOD PRESSURE: 114 MMHG | HEIGHT: 62 IN | DIASTOLIC BLOOD PRESSURE: 72 MMHG | OXYGEN SATURATION: 100 % | BODY MASS INDEX: 27.23 KG/M2 | HEART RATE: 70 BPM

## 2023-11-02 DIAGNOSIS — I47.29 NSVT (NONSUSTAINED VENTRICULAR TACHYCARDIA) (HCC): ICD-10-CM

## 2023-11-02 DIAGNOSIS — R07.89 CHEST DISCOMFORT: ICD-10-CM

## 2023-11-02 DIAGNOSIS — R00.2 PALPITATIONS: ICD-10-CM

## 2023-11-02 DIAGNOSIS — I49.1 PAC (PREMATURE ATRIAL CONTRACTION): ICD-10-CM

## 2023-11-02 DIAGNOSIS — Z95.0 CARDIAC PACEMAKER IN SITU: Primary | ICD-10-CM

## 2023-11-02 DIAGNOSIS — I49.5 SICK SINUS SYNDROME (HCC): ICD-10-CM

## 2023-11-02 DIAGNOSIS — I47.19 PAT (PAROXYSMAL ATRIAL TACHYCARDIA): ICD-10-CM

## 2023-11-02 DIAGNOSIS — I49.3 PVC (PREMATURE VENTRICULAR CONTRACTION): ICD-10-CM

## 2023-11-02 NOTE — PROGRESS NOTES
[facility] Cardiology  Cardiac Electrophysiology Clinic Care Note                  []Initial visit     [x]Established visit     Patient Name: Marquis Lorenzo - HWD:23/[birth year]43 -   Primary Cardiologist: Collins Peters MD  Electrophysiologist: Sofy Fuentes MD     Reason for visit: palpitation daily    HPI:  Ms. Farnaz Hickman is a 48 y.o. female who presents for pacemaker check   Medtronic pacer with RA pacing 43% and RV pacing 13%  12 year left    She reports frequent palpitations with some chest discomfort  She notes significant fatigue, but states she's able to run & exercise on her good days. States she is no longer on flecainide. She went to [facility] and said she was given diagnosis of POTS  She was sent to [facility] to be closer but not getting treatment she hopes for  She came back to see me   She wonders if pacer can be removed so she can exercise  She thought she cannot lift more than 50 lbs     Echo in 03/2022 showed normal LVEF. Holter in the past showed 3% PACs, few PVC. Stress echo was reported negative for ischemia by Dr Lucía Morales. On [facility] for BP support with diagnosis of POTS. She has 3 sec NSVT and 20 PAT on pacer check   Duration 24 seconds    Prior DVT, but no longer on anticoagulant. Previous:  Medtronic dual chamber pacemaker (DOI 01/07/2022). DURAN & dizziness at rest since COVID vaccine. Diagnosed with POTS at [facility], previously seen by Dr. Camila Palm at [facility]. History of DVT post knee surgery. Palpitations, likely PVCs after 3rd pregnancy. [place]. Works in [employer] with Dr Lucía Morales and Paulina Lynn. Assessment and Plan      Diagnosis Orders   1. Cardiac pacemaker in situ        2. Sick sinus syndrome (HCC)        3. Palpitations        4. PAC (premature atrial contraction)  dronedarone hcl (MULTAQ) 400 MG TABS      5. Chest discomfort        6. PVC (premature ventricular contraction)  dronedarone hcl (MULTAQ) 400 MG TABS      7.  NSVT (nonsustained ventricular

## 2023-11-02 NOTE — H&P (VIEW-ONLY)
[facility] Cardiology  Cardiac Electrophysiology Clinic Care Note                  []Initial visit     [x]Established visit     Patient Name: Yaya Aj - VGE:90/42/3249 -   Primary Cardiologist: Echo Khan MD  Electrophysiologist: Dena Bellamy MD     Reason for visit: palpitation daily    HPI:  Ms. Dione Schaumann is a 48 y.o. female who presents for pacemaker check   Medtronic pacer with RA pacing 43% and RV pacing 13%  12 year left    She reports frequent palpitations with some chest discomfort  She notes significant fatigue, but states she's able to run & exercise on her good days. States she is no longer on flecainide. She went to [facility] and said she was given diagnosis of POTS  She was sent to [facility] to be closer but not getting treatment she hopes for  She came back to see me   She wonders if pacer can be removed so she can exercise  She thought she cannot lift more than 50 lbs     Echo in 03/2022 showed normal LVEF. Holter in the past showed 3% PACs, few PVC. Stress echo was reported negative for ischemia by Dr Jac Reed. On [facility] for BP support with diagnosis of POTS. She has 3 sec NSVT and 20 PAT on pacer check   Duration 24 seconds    Prior DVT, but no longer on anticoagulant. Previous:  Medtronic dual chamber pacemaker (DOI 01/07/2022). DURAN & dizziness at rest since COVID vaccine. Diagnosed with POTS at [facility], previously seen by Dr. Josué Rojas at [facility]. History of DVT post knee surgery. Palpitations, likely PVCs after 3rd pregnancy. [place]. Works in [employer] with Dr Jac Reed and Ryan Cano. Assessment and Plan      Diagnosis Orders   1. Cardiac pacemaker in situ        2. Sick sinus syndrome (HCC)        3. Palpitations        4. PAC (premature atrial contraction)  dronedarone hcl (MULTAQ) 400 MG TABS      5. Chest discomfort        6. PVC (premature ventricular contraction)  dronedarone hcl (MULTAQ) 400 MG TABS      7.  NSVT (nonsustained ventricular post-stress is normal. Post-stress ejection fraction is 61%. Perfusion Comments: Prone images were obtained. LV perfusion is normal.    ECG: Resting ECG demonstrates AV pacing. ECG: The ECG was negative for ischemia. Stress Test: A John protocol stress test was performed. Overall, the patient's exercise capacity was excellent for their age. The patient reached stage 4 of the protocol and was stressed for 11 min and 0 sec. The patient experienced no angina during the test. The patient reported dyspnea and no chest pain during the stress test. Onset of symptoms occurred at stage 3 of the protocol. Symptoms began during stress and ended during recovery. Hemodynamics are adequate for diagnosis. Blood pressure demonstrated a normal response and heart rate demonstrated a normal response to stress. The patient's heart rate recovery was normal.    03/24/22    TRANSTHORACIC ECHOCARDIOGRAM (TTE) COMPLETE (CONTRAST/BUBBLE/3D PRN) 03/26/2022  3:34 PM, 03/26/2022 12:00 AM (Final)    Narrative  This is a summary report. The complete report is available in the patient's medical record. If you cannot access the medical record, please contact the sending organization for a detailed fax or copy. Left Ventricle: Left ventricle size is normal. Normal wall thickness. Normal wall motion. Normal left ventricular systolic function. EF by 2D Simpsons Biplane is 59%. Global longitudinal strain is -21.2%. Normal diastolic function.     Signed by: Fozia Mercer MD on 3/26/2022  3:34 PM, Signed by: Unknown Provider Result on 3/26/2022 12:00 AM           Review of Systems    [x]All other systems reviewed and all negative except as written in HPI    [] Patient unable to provide secondary to condition        Patient Active Problem List   Diagnosis    Sinus pause    Sick sinus syndrome (HCC)    Idiopathic hypotension    Premature atrial contractions    Cardiac pacemaker in situ    PSVT (paroxysmal supraventricular tachycardia)

## 2023-12-01 ENCOUNTER — ANESTHESIA EVENT (OUTPATIENT)
Facility: HOSPITAL | Age: 53
End: 2023-12-01
Payer: COMMERCIAL

## 2023-12-01 ENCOUNTER — ANESTHESIA (OUTPATIENT)
Facility: HOSPITAL | Age: 53
End: 2023-12-01
Payer: COMMERCIAL

## 2023-12-01 ENCOUNTER — HOSPITAL ENCOUNTER (OUTPATIENT)
Facility: HOSPITAL | Age: 53
Setting detail: OUTPATIENT SURGERY
Discharge: HOME | End: 2023-12-01
Attending: PODIATRIST | Admitting: PODIATRIST
Payer: COMMERCIAL

## 2023-12-01 ENCOUNTER — HOSPITAL ENCOUNTER (OUTPATIENT)
Facility: HOSPITAL | Age: 53
Discharge: HOME | End: 2023-12-04

## 2023-12-01 VITALS
OXYGEN SATURATION: 100 % | BODY MASS INDEX: 26.68 KG/M2 | SYSTOLIC BLOOD PRESSURE: 116 MMHG | RESPIRATION RATE: 16 BRPM | DIASTOLIC BLOOD PRESSURE: 71 MMHG | WEIGHT: 145 LBS | HEIGHT: 62 IN | HEART RATE: 60 BPM | TEMPERATURE: 98.2 F

## 2023-12-01 PROCEDURE — 3600000014 HC SURGERY LEVEL 4 ADDTL 15MIN: Performed by: PODIATRIST

## 2023-12-01 PROCEDURE — 2709999900 HC NON-CHARGEABLE SUPPLY: Performed by: PODIATRIST

## 2023-12-01 PROCEDURE — 6360000002 HC RX W HCPCS: Performed by: PODIATRIST

## 2023-12-01 PROCEDURE — 2580000003 HC RX 258: Performed by: NURSE ANESTHETIST, CERTIFIED REGISTERED

## 2023-12-01 PROCEDURE — 7100000011 HC PHASE II RECOVERY - ADDTL 15 MIN: Performed by: PODIATRIST

## 2023-12-01 PROCEDURE — 6360000002 HC RX W HCPCS: Performed by: NURSE ANESTHETIST, CERTIFIED REGISTERED

## 2023-12-01 PROCEDURE — 7100000010 HC PHASE II RECOVERY - FIRST 15 MIN: Performed by: PODIATRIST

## 2023-12-01 PROCEDURE — 3700000001 HC ADD 15 MINUTES (ANESTHESIA): Performed by: PODIATRIST

## 2023-12-01 PROCEDURE — 3700000000 HC ANESTHESIA ATTENDED CARE: Performed by: PODIATRIST

## 2023-12-01 PROCEDURE — 6360000002 HC RX W HCPCS: Performed by: ANESTHESIOLOGY

## 2023-12-01 PROCEDURE — 3600000004 HC SURGERY LEVEL 4 BASE: Performed by: PODIATRIST

## 2023-12-01 PROCEDURE — 2500000003 HC RX 250 WO HCPCS: Performed by: NURSE ANESTHETIST, CERTIFIED REGISTERED

## 2023-12-01 RX ORDER — LIDOCAINE HYDROCHLORIDE 10 MG/ML
1 INJECTION, SOLUTION EPIDURAL; INFILTRATION; INTRACAUDAL; PERINEURAL
OUTPATIENT
Start: 2023-12-01 | End: 2023-12-02

## 2023-12-01 RX ORDER — EPHEDRINE SULFATE/0.9% NACL/PF 50 MG/5 ML
SYRINGE (ML) INTRAVENOUS PRN
Status: DISCONTINUED | OUTPATIENT
Start: 2023-12-01 | End: 2023-12-01 | Stop reason: SDUPTHER

## 2023-12-01 RX ORDER — MIDAZOLAM HYDROCHLORIDE 2 MG/2ML
2 INJECTION, SOLUTION INTRAMUSCULAR; INTRAVENOUS
OUTPATIENT
Start: 2023-12-01 | End: 2023-12-02

## 2023-12-01 RX ORDER — ONDANSETRON 2 MG/ML
4 INJECTION INTRAMUSCULAR; INTRAVENOUS
Status: DISCONTINUED | OUTPATIENT
Start: 2023-12-01 | End: 2023-12-01 | Stop reason: HOSPADM

## 2023-12-01 RX ORDER — FENTANYL CITRATE 50 UG/ML
INJECTION, SOLUTION INTRAMUSCULAR; INTRAVENOUS PRN
Status: DISCONTINUED | OUTPATIENT
Start: 2023-12-01 | End: 2023-12-01 | Stop reason: SDUPTHER

## 2023-12-01 RX ORDER — FENTANYL CITRATE 50 UG/ML
100 INJECTION, SOLUTION INTRAMUSCULAR; INTRAVENOUS
OUTPATIENT
Start: 2023-12-01 | End: 2023-12-02

## 2023-12-01 RX ORDER — SODIUM CHLORIDE, SODIUM LACTATE, POTASSIUM CHLORIDE, CALCIUM CHLORIDE 600; 310; 30; 20 MG/100ML; MG/100ML; MG/100ML; MG/100ML
INJECTION, SOLUTION INTRAVENOUS CONTINUOUS
OUTPATIENT
Start: 2023-12-01

## 2023-12-01 RX ORDER — SODIUM CHLORIDE, SODIUM LACTATE, POTASSIUM CHLORIDE, CALCIUM CHLORIDE 600; 310; 30; 20 MG/100ML; MG/100ML; MG/100ML; MG/100ML
INJECTION, SOLUTION INTRAVENOUS CONTINUOUS
Status: DISCONTINUED | OUTPATIENT
Start: 2023-12-01 | End: 2023-12-01 | Stop reason: HOSPADM

## 2023-12-01 RX ORDER — BUPIVACAINE HYDROCHLORIDE 5 MG/ML
INJECTION, SOLUTION EPIDURAL; INTRACAUDAL PRN
Status: DISCONTINUED | OUTPATIENT
Start: 2023-12-01 | End: 2023-12-01 | Stop reason: ALTCHOICE

## 2023-12-01 RX ORDER — SODIUM CHLORIDE, SODIUM LACTATE, POTASSIUM CHLORIDE, CALCIUM CHLORIDE 600; 310; 30; 20 MG/100ML; MG/100ML; MG/100ML; MG/100ML
INJECTION, SOLUTION INTRAVENOUS CONTINUOUS PRN
Status: DISCONTINUED | OUTPATIENT
Start: 2023-12-01 | End: 2023-12-01 | Stop reason: SDUPTHER

## 2023-12-01 RX ORDER — DIPHENHYDRAMINE HYDROCHLORIDE 50 MG/ML
12.5 INJECTION INTRAMUSCULAR; INTRAVENOUS
Status: DISCONTINUED | OUTPATIENT
Start: 2023-12-01 | End: 2023-12-01 | Stop reason: HOSPADM

## 2023-12-01 RX ORDER — MIDAZOLAM HYDROCHLORIDE 1 MG/ML
INJECTION INTRAMUSCULAR; INTRAVENOUS PRN
Status: DISCONTINUED | OUTPATIENT
Start: 2023-12-01 | End: 2023-12-01 | Stop reason: SDUPTHER

## 2023-12-01 RX ORDER — PROPOFOL 10 MG/ML
INJECTION, EMULSION INTRAVENOUS CONTINUOUS PRN
Status: DISCONTINUED | OUTPATIENT
Start: 2023-12-01 | End: 2023-12-01 | Stop reason: SDUPTHER

## 2023-12-01 RX ORDER — ONDANSETRON 2 MG/ML
INJECTION INTRAMUSCULAR; INTRAVENOUS PRN
Status: DISCONTINUED | OUTPATIENT
Start: 2023-12-01 | End: 2023-12-01 | Stop reason: SDUPTHER

## 2023-12-01 RX ORDER — FAMOTIDINE 10 MG/ML
INJECTION, SOLUTION INTRAVENOUS PRN
Status: DISCONTINUED | OUTPATIENT
Start: 2023-12-01 | End: 2023-12-01 | Stop reason: SDUPTHER

## 2023-12-01 RX ADMIN — FAMOTIDINE 20 MG: 10 INJECTION, SOLUTION INTRAVENOUS at 13:45

## 2023-12-01 RX ADMIN — HYDROMORPHONE HYDROCHLORIDE 0.5 MG: 1 INJECTION, SOLUTION INTRAMUSCULAR; INTRAVENOUS; SUBCUTANEOUS at 14:55

## 2023-12-01 RX ADMIN — Medication 10 MG: at 14:13

## 2023-12-01 RX ADMIN — MIDAZOLAM HYDROCHLORIDE 2 MG: 1 INJECTION, SOLUTION INTRAMUSCULAR; INTRAVENOUS at 13:45

## 2023-12-01 RX ADMIN — PROPOFOL 150 MCG/KG/MIN: 10 INJECTION, EMULSION INTRAVENOUS at 13:53

## 2023-12-01 RX ADMIN — ONDANSETRON 4 MG: 2 INJECTION INTRAMUSCULAR; INTRAVENOUS at 13:45

## 2023-12-01 RX ADMIN — FENTANYL CITRATE 50 MCG: 50 INJECTION, SOLUTION INTRAMUSCULAR; INTRAVENOUS at 13:45

## 2023-12-01 RX ADMIN — Medication 10 MG: at 14:06

## 2023-12-01 RX ADMIN — SODIUM CHLORIDE, SODIUM LACTATE, POTASSIUM CHLORIDE, AND CALCIUM CHLORIDE: 600; 310; 30; 20 INJECTION, SOLUTION INTRAVENOUS at 13:45

## 2023-12-01 ASSESSMENT — PAIN DESCRIPTION - FREQUENCY: FREQUENCY: CONTINUOUS

## 2023-12-01 ASSESSMENT — PAIN DESCRIPTION - LOCATION
LOCATION: FOOT
LOCATION: FOOT

## 2023-12-01 ASSESSMENT — PAIN DESCRIPTION - DESCRIPTORS
DESCRIPTORS: DULL
DESCRIPTORS: ACHING

## 2023-12-01 ASSESSMENT — PAIN SCALES - GENERAL
PAINLEVEL_OUTOF10: 4
PAINLEVEL_OUTOF10: 4

## 2023-12-01 ASSESSMENT — PAIN DESCRIPTION - ORIENTATION
ORIENTATION: LEFT
ORIENTATION: LEFT

## 2023-12-01 ASSESSMENT — PAIN DESCRIPTION - PAIN TYPE: TYPE: ACUTE PAIN

## 2023-12-01 ASSESSMENT — PAIN - FUNCTIONAL ASSESSMENT: PAIN_FUNCTIONAL_ASSESSMENT: 0-10

## 2023-12-01 NOTE — DISCHARGE INSTRUCTIONS
Dr. Keysha Gunn. MARIELOS Bravo FACFAS FACFAOM FACCWS    The [address] [facility]  Post Operative Instructions: You have had a surgical procedure on your left foot. Fluids and Diet:  Begin with clear liquids, broth, dry toast, and crackers. If not nauseated then resume your regular pre-operative diet when you are ready    Medications: Take your prescriptions as directed  If your pain is not severe then you may take the non-prescription medication that you normally take for aches and pains  You may resume your regularly scheduled medications (unless otherwise directed)  If any side effects or adverse reactions occur, discontinue the medication and contact your doctor. Review the patient drug information that is provided before you take any medication    Ambulation and Activity:  You are advised to go directly home from the hospital  You may put weight on the operated foot. You should wear the surgical shoe at all times when awake. Avoid stairs if possible. Do not lift or move heavy objects  Do not drive until cleared by Dr. Van Orellana and Wound Care Instructions:  Keep bandage clean and dry  Do not shower or bathe the operative extremity  Do not remove the bandage (unless otherwise directed)  Do not attempt to put anything between the cast or dressing and your skin, some itching is normal.    Ice and Elevation:  Elevate operative extremity as much as possible to reduce swelling and discomfort. Elevate with 2 pillows at or above the level of the heart for the first 72 hours. Ice:  [facility] dispensed insulated ice bag over the bandage 20 minutes of every hour while awake for the first 72 hours. You {Actions; may/not:12812:o} behind the knee as well. Special Instructions: Call your doctor immediately if you develop any of the following. Fever over 100 degrees by mouth - take your temperature daily until your first follow up visit.   Pain not relieved by medication ordered  Swelling, increased surgery. If you had chest or belly surgery, use a pillow as a \"hug suzie\" and hold it tightly to your chest or belly when you cough. ANKLE PUMPS    Ankle pumps increase the circulation of oxygenated blood to your lower extremities and decrease your risk for circulation problems such as blood clots. They also stretch the muscles, tendons and ligaments in your foot and ankle, and prevent joint contracture in the ankle and foot, especially after surgeries on the legs. It is important to do ankle pump exercises regularly after surgery because immobility increases your risk for developing a blood clot. Your doctor may also have you take an Aspirin for the next few days as well. If your doctor did not ask you to take an Aspirin, consult with him before starting Aspirin therapy on your own. The exercise is quite simple. Slowly point your foot forward, feeling the muscles on the top of your lower leg stretch, and hold this position for 5 seconds. Next, pull your foot back toward you as far as possible, stretching the calf muscles, and hold that position for 5 seconds. Repeat with the other foot. Perform 10 repetitions every hour while awake for both ankles if possible (down and then up with the foot once is one repetition). You should feel gentle stretching of the muscles in your lower leg when doing this exercise. If you feel pain, or your range of motion is limited, don't push too hard. Only go the limit your joint and muscles will let you go. If you have increasing pain, progressively worsening leg warmth or swelling, STOP the exercise and call your doctor. These are general instructions for a healthy lifestyle:    *   Please give a list of your current medications to your Primary Care Provider.   *   Please update this list whenever your medications are discontinued, doses are changed, or new medications (including over-the-counter products) are added.  *   Please carry medication information at all times in case of emergency situations.           CONTENTS FOUND IN YOUR DISCHARGE ENVELOPE:  [x]     Surgeon and Hospital Discharge Instructions

## 2023-12-01 NOTE — BRIEF OP NOTE
Brief Postoperative Note      Patient: Sandi Friedman  YOB: 1970  MRN: [id number]    Date of Procedure: 12/1/2023    Pre-Op Diagnosis Codes:     * Pain from implanted hardware, initial encounter [T85.848A]     * Left foot pain [M79.672]    Post-Op Diagnosis: Same       Procedure(s):  LEFT FOOT HARDWARE REMOVAL (MAC/LOCAL)    Surgeon(s):  Cierra Bravo DPM    Assistant:  Surgical Assistant: Arabella FIERRO    Anesthesia: Monitor Anesthesia Care    Estimated Blood Loss (mL): Minimal    Complications: None    Specimens:   * No specimens in log *    Implants:  * No implants in log *      Drains: * No LDAs found *        Electronically signed by Evert Patel DPM on 12/1/2023 at 2:37 PM

## 2023-12-01 NOTE — INTERVAL H&P NOTE
Update History & Physical    The patient's History and Physical of November 2, 2023 was reviewed with the patient and I examined the patient. There was no change. The surgical site was confirmed by the patient and me. Plan: The risks, benefits, expected outcome, and alternative to the recommended procedure have been discussed with the patient. Patient understands and wants to proceed with the procedure.      Electronically signed by Chepe Uriarte DPM on 12/1/2023 at 12:35 PM

## 2023-12-01 NOTE — OP NOTE
Operative Note      Patient: Yaya Aj  YOB: 1970  MRN: [id number]    Date of Procedure: 12/1/2023    Pre-Op Diagnosis Codes:     * Pain from implanted hardware, initial encounter [T85.848A]     * Left foot pain [M79.672]    Post-Op Diagnosis: {MH OR NICOLASA:764320232}       Procedure(s):  LEFT FOOT HARDWARE REMOVAL (MAC/LOCAL)    Surgeon(s):  Brianna Bravo DPM    Assistant:   Surgical Assistant: Juliana FIERRO    Anesthesia: Monitor Anesthesia Care    Estimated Blood Loss (mL): {NUMBERS; HSQ:70887}    Complications: {Symptoms;  Intra-op complications:72951}    Specimens:   * No specimens in log *    Implants:  * No implants in log *      Drains: * No LDAs found *    Findings: ***        Detailed Description of Procedure:   ***    Electronically signed by Yoel Varghese DPM on 12/1/2023 at 2:37 PM

## 2023-12-05 NOTE — ANESTHESIA POSTPROCEDURE EVALUATION
Department of Anesthesiology  Postprocedure Note    Patient: Ailyn Alvarado  MRN: [id number]  YOB: 1970  Date of evaluation: 12/5/2023      Procedure Summary     Date: 12/01/23 Room / Location: [facility] MAIN OR  / [facility] MAIN OR    Anesthesia Start: 8449 Anesthesia Stop: 9790    Procedure: LEFT FOOT HARDWARE REMOVAL (MAC/LOCAL) (Left: Foot) Diagnosis:       Pain from implanted hardware, initial encounter      Left foot pain      (Pain from implanted hardware, initial encounter [Z52.893X])      (Left foot pain [M79.672])    Surgeons: Sofie Bravo DPM Responsible Provider: Shikha Lee MD    Anesthesia Type: MAC ASA Status: 3          Anesthesia Type: No value filed.     Major Phase I: Major Score: 10    Major Phase II:        Anesthesia Post Evaluation    Patient location during evaluation: PACU  Patient participation: complete - patient participated  Level of consciousness: awake  Airway patency: patent  Nausea & Vomiting: no vomiting and no nausea  Complications: no  Cardiovascular status: hemodynamically stable  Respiratory status: acceptable  Hydration status: stable  Pain management: adequate

## 2024-01-11 ENCOUNTER — TELEPHONE (OUTPATIENT)
Age: 54
End: 2024-01-11

## 2024-01-11 NOTE — TELEPHONE ENCOUNTER
Patient called ? If she can stop taking medication Multaq 400 mg while takig COVID medication would like callback    Patient# [phone]

## 2024-03-25 ENCOUNTER — PATIENT MESSAGE (OUTPATIENT)
Age: 54
End: 2024-03-25

## 2024-03-25 ENCOUNTER — TELEPHONE (OUTPATIENT)
Age: 54
End: 2024-03-25

## 2024-03-25 DIAGNOSIS — R06.09 DOE (DYSPNEA ON EXERTION): ICD-10-CM

## 2024-03-25 DIAGNOSIS — I47.19 PAT (PAROXYSMAL ATRIAL TACHYCARDIA) (HCC): Primary | ICD-10-CM

## 2024-03-25 DIAGNOSIS — R42 DIZZINESS AND GIDDINESS: ICD-10-CM

## 2024-03-25 DIAGNOSIS — Z95.0 CARDIAC PACEMAKER IN SITU: ICD-10-CM

## 2024-03-25 DIAGNOSIS — I49.5 SICK SINUS SYNDROME (HCC): ICD-10-CM

## 2024-03-25 NOTE — TELEPHONE ENCOUNTER
----- Message from Quan Patel LPN sent at 3/25/2024 10:23 AM EDT -----  Regarding: FW: Qulipta  Contact: [phone]    ----- Message -----  From: Oanh Jacobson  Sent: 3/25/2024  10:01 AM EDT  To: #  Subject: Qulipta                                          I've had an insurance change from [employer] to [employer] and received a letter that they either won't cover or you need to request approval for the Multaq. My new insurance ID is [id number].  To ask for approval call is [phone]. RX grp# [id number]. Can you please attempt to get this approved so I don't have to switch meds?  Additionally, I'm not sure if I need to schedule an appointment before the already scheduled July appointment or if this changes anything but I am waking up with a minor headache daily again. Not a migraine and it hasn't been bad enough for me to resort to taking Nurtec but more frequently and it's getting a bit more intense than it had been. I also have the other neurological issues on the reason for visit that I'd need to discuss.   Lastly, for my medical record and it may or may not change things but I've recently been diagnosed with Lupus.   Let me know if I need to schedule or what the plan is with the meds.   Thank you

## 2024-03-26 NOTE — TELEPHONE ENCOUNTER
From: Oanh Jacobson  To: Dr. Matthew Schofield  Sent: 3/25/2024 9:47 AM EDT  Subject: Multaq    I've had an insurance change from [employer] and received a letter that they either won't cover or you need to request approval for the Multaq. My new insurance ID is [id number]. To ask for approval call is [phone]. RX grp# [id number]. Can you please attempt to get this approved so I don't have to switch meds?  Also, I don't have an appointment until Nov but my ability to get warm is nearly impossible, I can't seem to get my heartrate up while walking on the treadmill and I'm getting winded more again. I'm not sure if I need an appointment to discuss or if meds need a change or??  Lastly, for my medical record and it may or may not change things but I've recently been diagnosed with Lupus.   Let me know if I need to schedule or what the plan is with the meds.   Thank you

## 2024-03-27 NOTE — TELEPHONE ENCOUNTER
Re: Qulipta    Located copy of card on [employer], scanned to chart. Created case in CM, key# [id number], pt has not tried preferred meds bc she is stable on this med and changing could cause adverse effects. PA is now approved, auth# [id number], effective 03/27/24-03/27/25, update to nurse.

## 2024-04-05 DIAGNOSIS — G43.019 INTRACTABLE MIGRAINE WITHOUT AURA AND WITHOUT STATUS MIGRAINOSUS: ICD-10-CM

## 2024-04-08 DIAGNOSIS — G43.019 INTRACTABLE MIGRAINE WITHOUT AURA AND WITHOUT STATUS MIGRAINOSUS: ICD-10-CM

## 2024-04-08 RX ORDER — ATOGEPANT 60 MG/1
1 TABLET ORAL DAILY
Qty: 30 TABLET | Refills: 5 | Status: CANCELLED | OUTPATIENT
Start: 2024-04-08

## 2024-04-09 ENCOUNTER — ANCILLARY PROCEDURE (OUTPATIENT)
Age: 54
End: 2024-04-09
Payer: MEDICAID

## 2024-04-09 ENCOUNTER — TELEPHONE (OUTPATIENT)
Age: 54
End: 2024-04-09

## 2024-04-09 VITALS
WEIGHT: 145 LBS | HEART RATE: 61 BPM | HEIGHT: 62 IN | DIASTOLIC BLOOD PRESSURE: 72 MMHG | SYSTOLIC BLOOD PRESSURE: 116 MMHG | BODY MASS INDEX: 26.68 KG/M2

## 2024-04-09 DIAGNOSIS — G43.019 INTRACTABLE MIGRAINE WITHOUT AURA AND WITHOUT STATUS MIGRAINOSUS: ICD-10-CM

## 2024-04-09 DIAGNOSIS — Z95.0 CARDIAC PACEMAKER IN SITU: ICD-10-CM

## 2024-04-09 DIAGNOSIS — R42 DIZZINESS AND GIDDINESS: ICD-10-CM

## 2024-04-09 DIAGNOSIS — R06.09 DOE (DYSPNEA ON EXERTION): ICD-10-CM

## 2024-04-09 PROCEDURE — 93017 CV STRESS TEST TRACING ONLY: CPT | Performed by: INTERNAL MEDICINE

## 2024-04-09 RX ORDER — ATOGEPANT 60 MG/1
1 TABLET ORAL DAILY
Qty: 30 TABLET | Refills: 5 | Status: SHIPPED | OUTPATIENT
Start: 2024-04-09

## 2024-04-09 RX ORDER — ATOGEPANT 60 MG/1
1 TABLET ORAL DAILY
Qty: 30 TABLET | Refills: 1 | OUTPATIENT
Start: 2024-04-09

## 2024-04-09 NOTE — TELEPHONE ENCOUNTER
Patient was informed by her pharmacy today she has zero refills on her medication Quilpta.The pharmacy ordered the medication but they need the script from the doctor.    PA approved on 03/27/24 - 03/27/25    Oanh has one pill left.

## 2024-04-11 ENCOUNTER — TELEPHONE (OUTPATIENT)
Age: 54
End: 2024-04-11

## 2024-04-11 ENCOUNTER — CLINICAL DOCUMENTATION (OUTPATIENT)
Age: 54
End: 2024-04-11

## 2024-04-11 LAB
ECHO BSA: 1.7 M2
STRESS ANGINA INDEX: 0
STRESS BASELINE DIAS BP: 72 MMHG
STRESS BASELINE HR: 63 BPM
STRESS BASELINE ST DEPRESSION: 0 MM
STRESS BASELINE SYS BP: 116 MMHG
STRESS ESTIMATED WORKLOAD: 13.7 METS
STRESS EXERCISE DUR MIN: 11 MIN
STRESS EXERCISE DUR SEC: 3 SEC
STRESS O2 SAT PEAK: 96 %
STRESS O2 SAT REST: 98 %
STRESS PEAK DIAS BP: 82 MMHG
STRESS PEAK SYS BP: 140 MMHG
STRESS PERCENT HR ACHIEVED: 86 %
STRESS POST PEAK HR: 146 BPM
STRESS RATE PRESSURE PRODUCT: NORMAL BPM*MMHG
STRESS TARGET HR: 169 BPM

## 2024-04-11 PROCEDURE — 93016 CV STRESS TEST SUPVJ ONLY: CPT | Performed by: INTERNAL MEDICINE

## 2024-04-11 PROCEDURE — 93018 CV STRESS TEST I&R ONLY: CPT | Performed by: INTERNAL MEDICINE

## 2024-04-11 NOTE — TELEPHONE ENCOUNTER
----- Message from Matthew Schofield MD sent at 4/11/2024  5:12 AM EDT -----  Good exercise capacity and heart rate response  No ischemia  Occasional PVC and ventricular pacing  Dizziness and dyspnea not correlated with heart rate or rhythm problem

## 2024-04-11 NOTE — PROGRESS NOTES
Treadmill test 4/2024  Good exercise capacity and heart rate response  No ischemia  Occasional PVC and ventricular pacing  Dizziness and dyspnea not correlated with heart rate or rhythm problem

## 2024-06-10 NOTE — PATIENT INSTRUCTIONS
Thank you for visiting [facility]!      For future medication refills, we require updated lab results and an upcoming appointment to be in our system prior to refilling prescriptions.  Without these two things, your refill will be DENIED.  If you miss your upcoming appointment, your refill will also be DENIED.      We appreciate your understanding of this critical clinical aspect of our practice. -Dr. Gomes & Kelly, NP

## 2024-06-19 ENCOUNTER — HOSPITAL ENCOUNTER (OUTPATIENT)
Age: 54
Discharge: HOME | End: 2024-06-22
Payer: MEDICAID

## 2024-06-19 ENCOUNTER — OFFICE VISIT (OUTPATIENT)
Age: 54
End: 2024-06-19
Payer: MEDICAID

## 2024-06-19 VITALS
WEIGHT: 147 LBS | SYSTOLIC BLOOD PRESSURE: 118 MMHG | TEMPERATURE: 98 F | HEART RATE: 61 BPM | OXYGEN SATURATION: 98 % | RESPIRATION RATE: 16 BRPM | DIASTOLIC BLOOD PRESSURE: 79 MMHG | BODY MASS INDEX: 26.89 KG/M2

## 2024-06-19 DIAGNOSIS — R76.8 POSITIVE ANA (ANTINUCLEAR ANTIBODY): Primary | ICD-10-CM

## 2024-06-19 DIAGNOSIS — M79.7 FIBROMYALGIA: ICD-10-CM

## 2024-06-19 DIAGNOSIS — R76.8 POSITIVE ANA (ANTINUCLEAR ANTIBODY): ICD-10-CM

## 2024-06-19 DIAGNOSIS — M25.50 POLYARTHRALGIA: ICD-10-CM

## 2024-06-19 PROCEDURE — 73630 X-RAY EXAM OF FOOT: CPT

## 2024-06-19 PROCEDURE — 73130 X-RAY EXAM OF HAND: CPT

## 2024-06-19 PROCEDURE — 73610 X-RAY EXAM OF ANKLE: CPT

## 2024-06-19 PROCEDURE — 99203 OFFICE O/P NEW LOW 30 MIN: CPT | Performed by: NURSE PRACTITIONER

## 2024-06-19 RX ORDER — RIMEGEPANT SULFATE 75 MG/75MG
TABLET, ORALLY DISINTEGRATING ORAL
COMMUNITY

## 2024-06-19 ASSESSMENT — ENCOUNTER SYMPTOMS
COLOR CHANGE: 0
VOMITING: 0
TROUBLE SWALLOWING: 0
BLOOD IN STOOL: 0
DIARRHEA: 1
NAUSEA: 0
COUGH: 0
ABDOMINAL PAIN: 0
EYE PAIN: 0
SORE THROAT: 0
SHORTNESS OF BREATH: 1
CONSTIPATION: 0
EYE REDNESS: 0

## 2024-06-19 ASSESSMENT — PATIENT HEALTH QUESTIONNAIRE - PHQ9
1. LITTLE INTEREST OR PLEASURE IN DOING THINGS: SEVERAL DAYS
2. FEELING DOWN, DEPRESSED OR HOPELESS: SEVERAL DAYS
SUM OF ALL RESPONSES TO PHQ QUESTIONS 1-9: 2
SUM OF ALL RESPONSES TO PHQ QUESTIONS 1-9: 2
SUM OF ALL RESPONSES TO PHQ9 QUESTIONS 1 & 2: 2
SUM OF ALL RESPONSES TO PHQ QUESTIONS 1-9: 2
SUM OF ALL RESPONSES TO PHQ QUESTIONS 1-9: 2

## 2024-06-19 NOTE — PROGRESS NOTES
Oanh Jacobson (:  1970) is a 53 y.o. female    2024 SAMY 1:160 Homogenous, SSA <20, SSB <20, RNP <20, Scl 70 <20, DsDNA <8, Anticardiolipin nl, RF <10, CCP 6    Subjective   HPI  Patient is a 53 year old female being seen at the request of Dr. Nathaniel Rowan for a positive SAMY. We reviewed her 2024 labs.   She reports after receiving Covid vaccine in  she developed POTS and pain in her fingers and toes and she thinks sometime her PIPs swell. She reports her right hand is the worse. It's hard for her to open bottles. She sees ortho for DDD in her neck and low back. She has morning stiffness that lasts 2 hours. She takes Diclofenac 75mg for pain and she says it takes the edge off. She denies a family history of RA or lupus. She denies recent infections or antibiotics.       Review of Systems   Constitutional:  Negative for chills and fever.   HENT:  Negative for mouth sores, sore throat and trouble swallowing.         Denies nasals sores  Denies dry mouth   Eyes:  Negative for pain and redness.        Denies dry eyes   Respiratory:  Positive for shortness of breath (POTS followed by cardiology). Negative for cough.    Cardiovascular:  Negative for chest pain.   Gastrointestinal:  Positive for diarrhea. Negative for abdominal pain, blood in stool, constipation, nausea and vomiting.        Denies dark, tarry stools  Reports IBS-D   Genitourinary:  Negative for dysuria and hematuria.   Musculoskeletal:  Positive for arthralgias and joint swelling.   Skin:  Positive for rash (Reports a sun sensititivty rashes on legs and hands). Negative for color change.     Current Outpatient Medications   Medication Sig Dispense Refill   • rimegepant sulfate (NURTEC) 75 MG TBDP Take by mouth     • Atogepant (QULIPTA) 60 MG TABS Take 1 tablet by mouth daily 30 tablet 5   • dronedarone hcl (MULTAQ) 400 MG TABS Take 1 tablet by mouth 2 times daily (with meals) 180 tablet 1   • eszopiclone (LUNESTA) 3 MG TABS

## 2024-06-19 NOTE — PROGRESS NOTES
Chief Complaint   Patient presents with    New Patient     1. Have you been to the ER, urgent care clinic since your last visit?  Hospitalized since your last visit?No    2. Have you seen or consulted any other health care providers outside of the [facility] since your last visit?  Include any pap smears or colon screening.  yes

## 2024-12-03 ENCOUNTER — HOSPITAL ENCOUNTER (EMERGENCY)
Facility: HOSPITAL | Age: 54
Discharge: LEFT BEFORE DISCHARGE | End: 2024-12-03
Attending: EMERGENCY MEDICINE

## 2024-12-03 VITALS
HEART RATE: 58 BPM | WEIGHT: 148.59 LBS | RESPIRATION RATE: 14 BRPM | BODY MASS INDEX: 27.18 KG/M2 | TEMPERATURE: 98.2 F | SYSTOLIC BLOOD PRESSURE: 112 MMHG | DIASTOLIC BLOOD PRESSURE: 84 MMHG | OXYGEN SATURATION: 97 %

## 2024-12-03 RX ORDER — UBROGEPANT 100 MG/1
TABLET ORAL
COMMUNITY

## 2024-12-03 ASSESSMENT — PAIN DESCRIPTION - DESCRIPTORS: DESCRIPTORS: ACHING

## 2024-12-03 ASSESSMENT — PAIN SCALES - GENERAL: PAINLEVEL_OUTOF10: 8

## 2024-12-03 ASSESSMENT — PAIN DESCRIPTION - LOCATION: LOCATION: HEAD;GENERALIZED

## 2024-12-03 NOTE — ED TRIAGE NOTES
ED triage note: ambulatory with a steady gait. Patient reports just moved to [place] and gets iv fluids every week. States has a history of migraines. States this migraine has been going on for 2 days. States the ubrelvy has not been helping this time.

## 2025-07-30 NOTE — ADDENDUM NOTE
Addended by: Lyubov Mares on: 1/7/2022 10:30 AM     Modules accepted: Orders
Photo Preface (Leave Blank If You Do Not Want): Photographs were taken today:
Detail Level: Detailed

## (undated) DEVICE — SUTURE ETHLN SZ 4-0 L18IN NONABSORBABLE BLK L19MM PS-2 3/8 1667H

## (undated) DEVICE — CANISTER, RIGID, 3000CC: Brand: MEDLINE INDUSTRIES, INC.

## (undated) DEVICE — GLOVE SURG SZ 8 L12IN FNGR THK79MIL GRN LTX FREE

## (undated) DEVICE — GOWN,SIRUS,NONRNF,SETINSLV,2XL,18/CS: Brand: MEDLINE

## (undated) DEVICE — DRESSING ANTIMIC FOAM OPTIFOAM POSTOP ADH 4 X 6 IN

## (undated) DEVICE — SPONGE GZ W4XL4IN COT 12 PLY TYP VII WVN C FLD DSGN STERILE

## (undated) DEVICE — GLOVE ORANGE PI 8   MSG9080

## (undated) DEVICE — MESH GAUZE FINE ROLL 1 PLY: Brand: DEROYAL

## (undated) DEVICE — INTRO PEELWY HEMVLV 7F 13CM -- SHRT PRELUDE SNAP

## (undated) DEVICE — PREP KIT PEEL PTCH POVIDONE IOD

## (undated) DEVICE — SOLUTION IRRIG 500ML 0.9% SOD CHLO USP POUR PLAS BTL

## (undated) DEVICE — SLING ORTHOPEDIC PCH UNIV 19.5X9 IN 2-39 IN ARM W/ FOAM STRP

## (undated) DEVICE — BIT DRL DIA2MM STR CANN FOR 2.5MM MIC COMPR FULL THRD SCR

## (undated) DEVICE — DRESSING PETRO W3XL3IN OIL EMUL N ADH GZ KNIT IMPREG CELOS

## (undated) DEVICE — SOLUTION SCRB 2OZ 10% POVIDONE IOD ANTIMIC BTL

## (undated) DEVICE — Device

## (undated) DEVICE — HYPODERMIC SAFETY NEEDLE: Brand: MONOJECT

## (undated) DEVICE — EXTREMITY-SFMCASU: Brand: MEDLINE INDUSTRIES, INC.

## (undated) DEVICE — SUTURE VLOC 90 2/0 VL 6 GS-22 VLOCM2105

## (undated) DEVICE — INTRO SHTH 9FR 13X20CM -- USE ITEM# 341577

## (undated) DEVICE — PACEMAKER SETUP: Brand: MEDLINE INDUSTRIES, INC.

## (undated) DEVICE — PADDING CAST 4 INX5 YD STRL

## (undated) DEVICE — DRAPE C ARM W/ PLT PROTCT NEO

## (undated) DEVICE — BANDAGE COMPR W4INXL5YD WHT BGE POLY COT M E WRP WV HK AND

## (undated) DEVICE — TUBING

## (undated) DEVICE — SUTURE VCRL SZ 3-0 L27IN ABSRB UD L26MM SH 1/2 CIR J416H

## (undated) DEVICE — SUTURE NONABSORBABLE MONOFILAMENT 4-0 P-3 18 IN ETHILON 699H

## (undated) DEVICE — SUTURE DEV SZ 3-0 V-LOC 90 L12IN TO L18IN CV-23 VLT VLOCM0844

## (undated) DEVICE — PADDING CAST W4INXL4YD ST COT RAYON MICROPLEATED HIGHLY

## (undated) DEVICE — SUTURE VCRL SZ 4-0 L27IN ABSRB UD L26MM SH 1/2 CIR J415H

## (undated) DEVICE — BANDAGE COMPR W6INXL10YD ST M E WHITE/BEIGE

## (undated) DEVICE — AGENT HEMSTAT 3GM PURIFIED PLNT STARCH PWD ABSRB ARISTA AH

## (undated) DEVICE — 3M™ IOBAN™ 2 ANTIMICROBIAL INCISE DRAPE 6650EZ: Brand: IOBAN™ 2

## (undated) DEVICE — SUTURE ETHBND EXCEL SZ 2 L30IN NONABSORBABLE GRN L40MM V-37 MX69G

## (undated) DEVICE — SOLUTION IRRIG 1000ML STRL H2O USP PLAS POUR BTL

## (undated) DEVICE — BLADE SAW W9XL25MM THK051MM CUT THK074MM REPL S STL SAG